# Patient Record
Sex: FEMALE | Race: ASIAN | NOT HISPANIC OR LATINO | Employment: PART TIME | URBAN - METROPOLITAN AREA
[De-identification: names, ages, dates, MRNs, and addresses within clinical notes are randomized per-mention and may not be internally consistent; named-entity substitution may affect disease eponyms.]

---

## 2024-01-26 ENCOUNTER — TRANSCRIBE ORDERS (OUTPATIENT)
Dept: OBSTETRICS AND GYNECOLOGY | Facility: CLINIC | Age: 34
End: 2024-01-26

## 2024-01-26 DIAGNOSIS — N91.1 SECONDARY AMENORRHEA: Primary | ICD-10-CM

## 2024-02-07 ENCOUNTER — HOSPITAL ENCOUNTER (OUTPATIENT)
Dept: RADIOLOGY | Facility: CLINIC | Age: 34
Discharge: HOME | End: 2024-02-07
Payer: COMMERCIAL

## 2024-02-07 ENCOUNTER — INITIAL PRENATAL (OUTPATIENT)
Dept: OBSTETRICS AND GYNECOLOGY | Facility: CLINIC | Age: 34
End: 2024-02-07
Payer: COMMERCIAL

## 2024-02-07 VITALS
WEIGHT: 178 LBS | HEIGHT: 63 IN | SYSTOLIC BLOOD PRESSURE: 120 MMHG | DIASTOLIC BLOOD PRESSURE: 84 MMHG | BODY MASS INDEX: 31.54 KG/M2

## 2024-02-07 DIAGNOSIS — Z33.1 PREGNANT STATE, INCIDENTAL (HHS-HCC): ICD-10-CM

## 2024-02-07 DIAGNOSIS — N91.1 SECONDARY AMENORRHEA: ICD-10-CM

## 2024-02-07 DIAGNOSIS — N91.1 AMENORRHEA, SECONDARY: ICD-10-CM

## 2024-02-07 LAB
ERYTHROCYTE [DISTWIDTH] IN BLOOD BY AUTOMATED COUNT: 13.2 % (ref 11.5–14.5)
HBV SURFACE AG SERPL QL IA: NONREACTIVE
HCT VFR BLD AUTO: 41.3 % (ref 36–46)
HCV AB SER QL: NONREACTIVE
HGB BLD-MCNC: 13.6 G/DL (ref 12–16)
HIV 1+2 AB+HIV1 P24 AG SERPL QL IA: NONREACTIVE
MCH RBC QN AUTO: 29.8 PG (ref 26–34)
MCHC RBC AUTO-ENTMCNC: 32.9 G/DL (ref 32–36)
MCV RBC AUTO: 91 FL (ref 80–100)
NRBC BLD-RTO: 0 /100 WBCS (ref 0–0)
PLATELET # BLD AUTO: 267 X10*3/UL (ref 150–450)
RBC # BLD AUTO: 4.56 X10*6/UL (ref 4–5.2)
RUBV IGG SERPL IA-ACNC: 0.9 IA
RUBV IGG SERPL QL IA: NORMAL
TREPONEMA PALLIDUM IGG+IGM AB [PRESENCE] IN SERUM OR PLASMA BY IMMUNOASSAY: NONREACTIVE
WBC # BLD AUTO: 9.6 X10*3/UL (ref 4.4–11.3)

## 2024-02-07 PROCEDURE — 87340 HEPATITIS B SURFACE AG IA: CPT

## 2024-02-07 PROCEDURE — 0500F INITIAL PRENATAL CARE VISIT: CPT | Performed by: OBSTETRICS & GYNECOLOGY

## 2024-02-07 PROCEDURE — 85027 COMPLETE CBC AUTOMATED: CPT

## 2024-02-07 PROCEDURE — 86317 IMMUNOASSAY INFECTIOUS AGENT: CPT

## 2024-02-07 PROCEDURE — 36415 COLL VENOUS BLD VENIPUNCTURE: CPT

## 2024-02-07 PROCEDURE — 86803 HEPATITIS C AB TEST: CPT

## 2024-02-07 PROCEDURE — 87086 URINE CULTURE/COLONY COUNT: CPT

## 2024-02-07 PROCEDURE — 87624 HPV HI-RISK TYP POOLED RSLT: CPT

## 2024-02-07 PROCEDURE — 87800 DETECT AGNT MULT DNA DIREC: CPT

## 2024-02-07 PROCEDURE — 88175 CYTOPATH C/V AUTO FLUID REDO: CPT

## 2024-02-07 PROCEDURE — 86780 TREPONEMA PALLIDUM: CPT

## 2024-02-07 PROCEDURE — 76815 OB US LIMITED FETUS(S): CPT | Performed by: OBSTETRICS & GYNECOLOGY

## 2024-02-07 PROCEDURE — 87389 HIV-1 AG W/HIV-1&-2 AB AG IA: CPT

## 2024-02-07 PROCEDURE — 86901 BLOOD TYPING SEROLOGIC RH(D): CPT

## 2024-02-07 PROCEDURE — 86850 RBC ANTIBODY SCREEN: CPT

## 2024-02-07 PROCEDURE — 86900 BLOOD TYPING SEROLOGIC ABO: CPT

## 2024-02-07 NOTE — PROGRESS NOTES
Subjective   Patient ID: Chapo Martinez is a 33 y.o. female who presents for Initial Prenatal Visit (New Ob/Lmp 11/4/23/13+ weeks, edc 8/10/24//No complaints//Chaperone declined).  HPI  As contained chief complaint  Review of Systems  Please see HPI for reported pertinent positives, which would supersede this ROS    Constitutional: No chills, no fever and no night sweats, weight gain or loss, fatigue    Genitourinary: Denies genital lesion or sores, vaginal dryness, itching or pain.  No abnormal vaginal discharge or unexplained vaginal bleeding.  No dysuria, urinary incontinence or frequency.  Denies pelvic pain, dysmenorrhea or dyspareunia    Eyes: Denies vision changes, dryness  ENT: No hearing loss, sinus pain or congestion,nosebleeds.  No hoarseness and no sore throat  Neck: No masses and no swelling  Cardiovascular: No chest pain, no intermittent leg claudication, no lower extremity edema, no palpitations and no syncope  Respiratory: No shortness of breath, cough, wheezing  Gastrointestinal: Denies nausea, vomiting, diarrhea, constipation, abdominal pain  Musculoskeletal: No new back pain joint pain, peripheral edema  Skin: No rash or skin lesion  Neurologic: No headache or dizziness.  No limb weakness, no tingling and no numbness  Psychiatric: No new anxiety or depression, no anhedonia and no substance use disorders  Hematologic/lymphatic: No swollen lymph nodes.  No reported tendency for easy bruising or bleeding new  Endocrine: No loss of hair or hirsutism    Patient reports no other systemic complaints.      Objective   Physical Exam  Vital signs reviewed    CONSTITUTIONAL- well nourished, well developed, looks like stated age.  She is sitting comfortably on the exam table in no apparent distress  SKIN- normal skin color and pigmentation no visible lesions  EYES- normal external exam  THYROID- symmetrical ,normal size and normal consistency  HEART- RRR without murmur, S3 or S4.  LUNGS- breathing  comfortably, no dyspnea  EXTREMITIES- no deformities  NEUROLOGICAL- oriented and no focal signs.  Cranial nerves II through XII intact  PSYCHIATRIC- alert, pleasant and cordial, age-appropriate, not anxious, or depressed appearing  BREASTS-normal appearance, no skin changes or nipple discharge.  Palpation of the breast and axillae: No palpable mass and no axillary lymphadenopathy  ABDOMEN- soft, non distended, bowel sound normal pitch and intensity,no palpable abnormal masses  GENITOURINARY- External genitalia: Normal.                                 - Uterus: AV/AF uterus enlarged 13 to 15 weeks gestational size fetal heart tones strong at 148 bpm, mobile and nontender                                -The adnexal areas are free of tenderness or mass                                -There are no cervical lesions; there is no cervical motion tenderness                                -Vagina without lesions, mucosa pink and well-hydrated, discharge is physiologic.                                   Inspection of Perianal Area: Normal    Assessment/Plan   Diagnoses and all orders for this visit:  Amenorrhea, secondary  Pregnant state, incidental  -Will check pelvic ultrasound for viability and dating.  -Discussed prenatal care plan  -Prenatal lab obtained.  -Return to office 3 weeks           Quoc Sawyer DO 02/07/24 11:17 AM

## 2024-02-08 LAB
ABO GROUP (TYPE) IN BLOOD: NORMAL
ANTIBODY SCREEN: NORMAL
BACTERIA UR CULT: NO GROWTH
C TRACH RRNA SPEC QL NAA+PROBE: NEGATIVE
N GONORRHOEA DNA SPEC QL PROBE+SIG AMP: NEGATIVE
RH FACTOR (ANTIGEN D): NORMAL

## 2024-02-22 LAB
CYTOLOGY CMNT CVX/VAG CYTO-IMP: NORMAL
HPV HR 12 DNA GENITAL QL NAA+PROBE: POSITIVE
HPV HR GENOTYPES PNL CVX NAA+PROBE: POSITIVE
HPV16 DNA SPEC QL NAA+PROBE: NEGATIVE
HPV18 DNA SPEC QL NAA+PROBE: NEGATIVE
LAB AP HPV GENOTYPE QUESTION: YES
LAB AP HPV HR: NORMAL
LABORATORY COMMENT REPORT: NORMAL
LMP START DATE: NORMAL
MENSTRUAL HX REPORTED: NORMAL
PATH REPORT.TOTAL CANCER: NORMAL

## 2024-02-28 ENCOUNTER — ROUTINE PRENATAL (OUTPATIENT)
Dept: OBSTETRICS AND GYNECOLOGY | Facility: CLINIC | Age: 34
End: 2024-02-28
Payer: COMMERCIAL

## 2024-02-28 VITALS — WEIGHT: 179 LBS | DIASTOLIC BLOOD PRESSURE: 86 MMHG | SYSTOLIC BLOOD PRESSURE: 126 MMHG | BODY MASS INDEX: 31.71 KG/M2

## 2024-02-28 DIAGNOSIS — Z36.3 SCREENING, ANTENATAL, FOR MALFORMATION BY ULTRASOUND (HHS-HCC): ICD-10-CM

## 2024-02-28 DIAGNOSIS — Z3A.16 16 WEEKS GESTATION OF PREGNANCY (HHS-HCC): ICD-10-CM

## 2024-02-28 DIAGNOSIS — Z34.02 ENCOUNTER FOR SUPERVISION OF NORMAL FIRST PREGNANCY IN SECOND TRIMESTER (HHS-HCC): ICD-10-CM

## 2024-02-28 PROCEDURE — 0501F PRENATAL FLOW SHEET: CPT | Performed by: OBSTETRICS & GYNECOLOGY

## 2024-02-28 NOTE — PROGRESS NOTES
"Subjective   Patient ID [id number]   Chapo Martinez is a 33 y.o.  at 16w4d with a working estimated date of delivery of 8/10/2024, by Last Menstrual Period who presents for a routine prenatal visit. She denies vaginal bleeding, leakage of fluid, decreased fetal movements, or contractions.      Objective   Physical Exam  Weight: 81.2 kg (179 lb)  Expected Total Weight Gain: 5 kg (11 lb)-9 kg (19 lb)   Pregravid BMI: 31.01  BP: 126/86         Prenatal Labs  Urine dip:  No results found for: \"KETONESU\", \"GLUCOSEUR\", \"LEUKOCYTESUR\"    Lab Results   Component Value Date    HGB 13.6 2024    HCT 41.3 2024    ABO A 2024    HEPBSAG Nonreactive 2024     No results found for: \"PAPPA\", \"AFP\", \"HCG\", \"ESTRIOL\", \"INHBA\"  No results found for: \"GLUF\", \"GLUT1\", \"MUJUUWF1DT\", \"QPVPBUQ4DI\"    Imaging  The most recent ultrasound was performed on The most recent ultrasound study is not finalized with a study GA of The most recent ultrasound study is not finalized and EFW of The most recent ultrasound study is not finalized.  The most recent ultrasound study is not finalized  The most recent ultrasound study is not finalized    Assessment/Plan   Diagnoses and all orders for this visit:  Screening, , for malformation by ultrasound  -     US OB 14+ weeks anatomy scan; Future  Encounter for supervision of normal first pregnancy in second trimester  16 weeks gestation of pregnancy      Continue prenatal vitamin.    "

## 2024-03-27 ENCOUNTER — APPOINTMENT (OUTPATIENT)
Dept: RADIOLOGY | Facility: CLINIC | Age: 34
End: 2024-03-27
Payer: COMMERCIAL

## 2024-03-27 ENCOUNTER — APPOINTMENT (OUTPATIENT)
Dept: OBSTETRICS AND GYNECOLOGY | Facility: CLINIC | Age: 34
End: 2024-03-27
Payer: COMMERCIAL

## 2024-04-03 ENCOUNTER — ROUTINE PRENATAL (OUTPATIENT)
Dept: OBSTETRICS AND GYNECOLOGY | Facility: CLINIC | Age: 34
End: 2024-04-03
Payer: COMMERCIAL

## 2024-04-03 ENCOUNTER — HOSPITAL ENCOUNTER (OUTPATIENT)
Dept: RADIOLOGY | Facility: CLINIC | Age: 34
Discharge: HOME | End: 2024-04-03
Payer: COMMERCIAL

## 2024-04-03 VITALS — WEIGHT: 180 LBS | SYSTOLIC BLOOD PRESSURE: 118 MMHG | BODY MASS INDEX: 31.89 KG/M2 | DIASTOLIC BLOOD PRESSURE: 82 MMHG

## 2024-04-03 DIAGNOSIS — Z3A.21 21 WEEKS GESTATION OF PREGNANCY (HHS-HCC): ICD-10-CM

## 2024-04-03 DIAGNOSIS — Z36.3 SCREENING, ANTENATAL, FOR MALFORMATION BY ULTRASOUND (HHS-HCC): ICD-10-CM

## 2024-04-03 DIAGNOSIS — Z34.02 ENCOUNTER FOR SUPERVISION OF NORMAL FIRST PREGNANCY IN SECOND TRIMESTER (HHS-HCC): ICD-10-CM

## 2024-04-03 PROCEDURE — 76811 OB US DETAILED SNGL FETUS: CPT | Performed by: OBSTETRICS & GYNECOLOGY

## 2024-04-03 PROCEDURE — 0501F PRENATAL FLOW SHEET: CPT | Performed by: OBSTETRICS & GYNECOLOGY

## 2024-04-03 NOTE — PROGRESS NOTES
"Subjective   Patient ID [id number]   Chapo Martinez is a 33 y.o.  at 21w4d with a working estimated date of delivery of 8/10/2024, by Last Menstrual Period who presents for a routine prenatal visit. She denies vaginal bleeding, leakage of fluid, decreased fetal movements, or contractions.        Objective   Physical Exam  Weight: 81.6 kg (180 lb)  Expected Total Weight Gain: 5 kg (11 lb)-9 kg (19 lb)   Pregravid BMI: 31.01  BP: 118/82         Prenatal Labs  Urine dip:  No results found for: \"KETONESU\", \"GLUCOSEUR\", \"LEUKOCYTESUR\"    Lab Results   Component Value Date    HGB 13.6 2024    HCT 41.3 2024    ABO A 2024    HEPBSAG Nonreactive 2024     No results found for: \"PAPPA\", \"AFP\", \"HCG\", \"ESTRIOL\", \"INHBA\"  No results found for: \"GLUF\", \"GLUT1\", \"WNZQOTB5JZ\", \"ZLXNCSH4IH\"    Imaging  The most recent ultrasound was performed on US 24 13w0d EDC 24  NT 1.3mms- LAY  US 4-3-24 20w0d EDC 24 female - LAY  Assessment/Plan   Diagnoses and all orders for this visit:  Encounter for supervision of normal first pregnancy in second trimester  21 weeks gestation of pregnancy      Continue prenatal vitamin.    "

## 2024-04-24 ENCOUNTER — ROUTINE PRENATAL (OUTPATIENT)
Dept: OBSTETRICS AND GYNECOLOGY | Facility: CLINIC | Age: 34
End: 2024-04-24
Payer: COMMERCIAL

## 2024-04-24 VITALS — SYSTOLIC BLOOD PRESSURE: 124 MMHG | BODY MASS INDEX: 32.59 KG/M2 | DIASTOLIC BLOOD PRESSURE: 84 MMHG | WEIGHT: 184 LBS

## 2024-04-24 DIAGNOSIS — Z34.02 ENCOUNTER FOR SUPERVISION OF NORMAL FIRST PREGNANCY IN SECOND TRIMESTER (HHS-HCC): ICD-10-CM

## 2024-04-24 DIAGNOSIS — Z3A.24 24 WEEKS GESTATION OF PREGNANCY (HHS-HCC): ICD-10-CM

## 2024-04-24 PROCEDURE — 0501F PRENATAL FLOW SHEET: CPT | Performed by: OBSTETRICS & GYNECOLOGY

## 2024-04-24 NOTE — PROGRESS NOTES
"Subjective   Patient ID [id number]   Chapo Martinez is a 33 y.o.  at 24w4d with a working estimated date of delivery of 8/10/2024, by Last Menstrual Period who presents for a routine prenatal visit. She denies vaginal bleeding, leakage of fluid, decreased fetal movements, or contractions.      Objective   Physical Exam  Weight: 83.5 kg (184 lb)  Expected Total Weight Gain: 5 kg (11 lb)-9 kg (19 lb)   Pregravid BMI: 31.01  BP: 124/84         Prenatal Labs  Urine dip:  No results found for: \"KETONESU\", \"GLUCOSEUR\", \"LEUKOCYTESUR\"    Lab Results   Component Value Date    HGB 13.6 2024    HCT 41.3 2024    ABO A 2024    HEPBSAG Nonreactive 2024     No results found for: \"PAPPA\", \"AFP\", \"HCG\", \"ESTRIOL\", \"INHBA\"  No results found for: \"GLUF\", \"GLUT1\", \"WYQFRFE7SC\", \"AVAJSVK1SR\"    Imaging  The most recent ultrasound was performed on  US 24 13w0d [facility] 24  NT 1.3mms- LAY  US 4-3-24 20w0d EDC 24 female - LAY    Assessment/Plan   Diagnoses and all orders for this visit:  Encounter for supervision of normal first pregnancy in second trimester ([facility])  24 weeks gestation of pregnancy ([facility])      Continue prenatal vitamin.    "

## 2024-05-22 ENCOUNTER — ROUTINE PRENATAL (OUTPATIENT)
Dept: OBSTETRICS AND GYNECOLOGY | Facility: CLINIC | Age: 34
End: 2024-05-22
Payer: COMMERCIAL

## 2024-05-22 VITALS — DIASTOLIC BLOOD PRESSURE: 86 MMHG | SYSTOLIC BLOOD PRESSURE: 128 MMHG | BODY MASS INDEX: 32.77 KG/M2 | WEIGHT: 185 LBS

## 2024-05-22 DIAGNOSIS — Z3A.28 28 WEEKS GESTATION OF PREGNANCY (HHS-HCC): ICD-10-CM

## 2024-05-22 DIAGNOSIS — Z34.03 ENCOUNTER FOR SUPERVISION OF NORMAL FIRST PREGNANCY IN THIRD TRIMESTER (HHS-HCC): ICD-10-CM

## 2024-05-22 LAB
ERYTHROCYTE [DISTWIDTH] IN BLOOD BY AUTOMATED COUNT: 13.6 % (ref 11.5–14.5)
GLUCOSE 1H P 50 G GLC PO SERPL-MCNC: 126 MG/DL
HCT VFR BLD AUTO: 44 % (ref 36–46)
HGB BLD-MCNC: 14.3 G/DL (ref 12–16)
MCH RBC QN AUTO: 30.7 PG (ref 26–34)
MCHC RBC AUTO-ENTMCNC: 32.5 G/DL (ref 32–36)
MCV RBC AUTO: 94 FL (ref 80–100)
NRBC BLD-RTO: 0 /100 WBCS (ref 0–0)
PLATELET # BLD AUTO: 283 X10*3/UL (ref 150–450)
RBC # BLD AUTO: 4.66 X10*6/UL (ref 4–5.2)
WBC # BLD AUTO: 9.2 X10*3/UL (ref 4.4–11.3)

## 2024-05-22 PROCEDURE — 0501F PRENATAL FLOW SHEET: CPT | Performed by: OBSTETRICS & GYNECOLOGY

## 2024-05-22 PROCEDURE — 86780 TREPONEMA PALLIDUM: CPT

## 2024-05-22 PROCEDURE — 36415 COLL VENOUS BLD VENIPUNCTURE: CPT

## 2024-05-22 PROCEDURE — 85027 COMPLETE CBC AUTOMATED: CPT

## 2024-05-22 PROCEDURE — 82947 ASSAY GLUCOSE BLOOD QUANT: CPT

## 2024-05-22 NOTE — PROGRESS NOTES
"Subjective   Patient ID [id number]   Chapo Martinez is a 33 y.o.  at 28w4d with a working estimated date of delivery of 8/10/2024, by Last Menstrual Period who presents for a routine prenatal visit. She denies vaginal bleeding, leakage of fluid, decreased fetal movements, or contractions.        Objective   Physical Exam  Weight: 83.9 kg (185 lb)  Expected Total Weight Gain: 5 kg (11 lb)-9 kg (19 lb)   Pregravid BMI: 31.01  BP: 128/86         Prenatal Labs  Urine dip:  No results found for: \"KETONESU\", \"GLUCOSEUR\", \"LEUKOCYTESUR\"    Lab Results   Component Value Date    HGB 13.6 2024    HCT 41.3 2024    ABO A 2024    HEPBSAG Nonreactive 2024     No results found for: \"PAPPA\", \"AFP\", \"HCG\", \"ESTRIOL\", \"INHBA\"  No results found for: \"GLUF\", \"GLUT1\", \"GNMIHPA6NG\", \"EZTLYDQ2KJ\"    Imaging  The most recent ultrasound was performed on   US 4-3-24 20w0d EDC 24 female           Assessment/Plan   Diagnoses and all orders for this visit:  Encounter for supervision of normal first pregnancy in third trimester ([facility])  28 weeks gestation of pregnancy ([facility])      Continue prenatal vitamin.    "

## 2024-05-23 LAB — TREPONEMA PALLIDUM IGG+IGM AB [PRESENCE] IN SERUM OR PLASMA BY IMMUNOASSAY: NONREACTIVE

## 2024-06-19 ENCOUNTER — APPOINTMENT (OUTPATIENT)
Dept: OBSTETRICS AND GYNECOLOGY | Facility: CLINIC | Age: 34
End: 2024-06-19
Payer: COMMERCIAL

## 2024-06-19 VITALS — BODY MASS INDEX: 33.3 KG/M2 | WEIGHT: 188 LBS | SYSTOLIC BLOOD PRESSURE: 124 MMHG | DIASTOLIC BLOOD PRESSURE: 82 MMHG

## 2024-06-19 DIAGNOSIS — Z3A.32 32 WEEKS GESTATION OF PREGNANCY (HHS-HCC): ICD-10-CM

## 2024-06-19 DIAGNOSIS — Z34.03 ENCOUNTER FOR SUPERVISION OF NORMAL FIRST PREGNANCY IN THIRD TRIMESTER (HHS-HCC): ICD-10-CM

## 2024-06-19 PROCEDURE — 0501F PRENATAL FLOW SHEET: CPT | Performed by: OBSTETRICS & GYNECOLOGY

## 2024-06-19 NOTE — PROGRESS NOTES
"Subjective   Patient ID [id number]   Chapo Martinez is a 33 y.o.  at 32w4d with a working estimated date of delivery of 8/10/2024, by Last Menstrual Period who presents for a routine prenatal visit. She denies vaginal bleeding, leakage of fluid, decreased fetal movements, or contractions.      Objective   Physical Exam:   Weight: 85.3 kg (188 lb)  Expected Total Weight Gain: 5 kg (11 lb)-9 kg (19 lb)   Pregravid BMI: 31.01  BP: 124/82                  Prenatal Labs  Urine Dip:  No results found for: \"KETONESU\", \"GLUCOSEUR\", \"LEUKOCYTESUR\"  Lab Results   Component Value Date    HGB 14.3 2024    HCT 44.0 2024    ABO A 2024    HEPBSAG Nonreactive 2024     No results found for: \"PAPPA\", \"AFP\", \"HCG\", \"ESTRIOL\", \"INHBA\"  No results found for: \"GLUF\", \"GLUT1\", \"URZRQOX6ZG\", \"AGJYCJY6LP\"    ImagingUS 4-3-24 20w0d EDC 24 female - LAY      Assessment/Plan   Diagnoses and all orders for this visit:  Encounter for supervision of normal first pregnancy in third trimester ([facility])  32 weeks gestation of pregnancy ([facility])    Continue prenatal vitamin.    "

## 2024-07-03 ENCOUNTER — APPOINTMENT (OUTPATIENT)
Dept: OBSTETRICS AND GYNECOLOGY | Facility: CLINIC | Age: 34
End: 2024-07-03
Payer: COMMERCIAL

## 2024-07-03 VITALS — WEIGHT: 188 LBS | DIASTOLIC BLOOD PRESSURE: 82 MMHG | BODY MASS INDEX: 33.3 KG/M2 | SYSTOLIC BLOOD PRESSURE: 112 MMHG

## 2024-07-03 DIAGNOSIS — Z3A.34 34 WEEKS GESTATION OF PREGNANCY (HHS-HCC): ICD-10-CM

## 2024-07-03 DIAGNOSIS — Z34.03 ENCOUNTER FOR SUPERVISION OF NORMAL FIRST PREGNANCY IN THIRD TRIMESTER (HHS-HCC): ICD-10-CM

## 2024-07-03 DIAGNOSIS — O36.8130 DECREASED FETAL MOVEMENTS IN THIRD TRIMESTER, SINGLE OR UNSPECIFIED FETUS (HHS-HCC): ICD-10-CM

## 2024-07-03 PROCEDURE — 0501F PRENATAL FLOW SHEET: CPT | Performed by: OBSTETRICS & GYNECOLOGY

## 2024-07-03 NOTE — PROGRESS NOTES
"Subjective   Patient ID [id number]   Chapo Martinez is a 33 y.o.  at 34w4d with a working estimated date of delivery of 8/10/2024, by Last Menstrual Period who presents for a routine prenatal visit. She denies vaginal bleeding, leakage of fluid, decreased fetal movements, or contractions.      Objective   Physical Exam:   Weight: 85.3 kg (188 lb)  Expected Total Weight Gain: 5 kg (11 lb)-9 kg (19 lb)   Pregravid BMI: 31.01  BP: 112/82                  Prenatal Labs  Urine Dip:  No results found for: \"KETONESU\", \"GLUCOSEUR\", \"LEUKOCYTESUR\"  Lab Results   Component Value Date    HGB 14.3 2024    HCT 44.0 2024    ABO A 2024    HEPBSAG Nonreactive 2024     No results found for: \"PAPPA\", \"AFP\", \"HCG\", \"ESTRIOL\", \"INHBA\"  No results found for: \"GLUF\", \"GLUT1\", \"HPYKDGA0EG\", \"RAVOFNM8EJ\"    Imaging      Assessment/Plan   Diagnoses and all orders for this visit:  Encounter for supervision of normal first pregnancy in third trimester ([facility])  34 weeks gestation of pregnancy ([facility])  Decreased fetal movements in third trimester, single or unspecified fetus ([facility])  -     US OB 14+ weeks anatomy scan; Future    Continue prenatal vitamin.    "

## 2024-07-13 ENCOUNTER — HOSPITAL ENCOUNTER (INPATIENT)
Facility: HOSPITAL | Age: 34
End: 2024-07-13
Attending: STUDENT IN AN ORGANIZED HEALTH CARE EDUCATION/TRAINING PROGRAM | Admitting: STUDENT IN AN ORGANIZED HEALTH CARE EDUCATION/TRAINING PROGRAM
Payer: COMMERCIAL

## 2024-07-13 ENCOUNTER — APPOINTMENT (OUTPATIENT)
Dept: CARDIOLOGY | Facility: HOSPITAL | Age: 34
End: 2024-07-13
Payer: COMMERCIAL

## 2024-07-13 ENCOUNTER — ANESTHESIA EVENT (OUTPATIENT)
Dept: OBSTETRICS AND GYNECOLOGY | Facility: HOSPITAL | Age: 34
End: 2024-07-13
Payer: COMMERCIAL

## 2024-07-13 ENCOUNTER — ANESTHESIA (OUTPATIENT)
Dept: OBSTETRICS AND GYNECOLOGY | Facility: HOSPITAL | Age: 34
End: 2024-07-13
Payer: COMMERCIAL

## 2024-07-13 DIAGNOSIS — R07.9 CHEST PAIN, UNSPECIFIED: ICD-10-CM

## 2024-07-13 DIAGNOSIS — I21.4 NSTEMI (NON-ST ELEVATED MYOCARDIAL INFARCTION) (MULTI): ICD-10-CM

## 2024-07-13 DIAGNOSIS — O14.13 SEVERE PREECLAMPSIA, THIRD TRIMESTER (HHS-HCC): ICD-10-CM

## 2024-07-13 DIAGNOSIS — O14.93 PRE-ECLAMPSIA IN THIRD TRIMESTER (HHS-HCC): Primary | ICD-10-CM

## 2024-07-13 PROBLEM — O14.90 PREECLAMPSIA (HHS-HCC): Status: ACTIVE | Noted: 2024-07-13

## 2024-07-13 LAB
ABO GROUP (TYPE) IN BLOOD: NORMAL
ABO GROUP (TYPE) IN BLOOD: NORMAL
ALBUMIN SERPL BCP-MCNC: 3.2 G/DL (ref 3.4–5)
ALBUMIN SERPL BCP-MCNC: 3.7 G/DL (ref 3.4–5)
ALP SERPL-CCNC: 143 U/L (ref 33–110)
ALP SERPL-CCNC: 165 U/L (ref 33–110)
ALT SERPL W P-5'-P-CCNC: 1120 U/L (ref 7–45)
ALT SERPL W P-5'-P-CCNC: 278 U/L (ref 7–45)
ANION GAP SERPL CALC-SCNC: 16 MMOL/L (ref 10–20)
ANION GAP SERPL CALC-SCNC: 16 MMOL/L (ref 10–20)
ANTIBODY SCREEN: NORMAL
ANTIBODY SCREEN: NORMAL
APTT PPP: 28 SECONDS (ref 27–38)
APTT PPP: 30 SECONDS (ref 27–38)
AST SERPL W P-5'-P-CCNC: 1174 U/L (ref 9–39)
AST SERPL W P-5'-P-CCNC: 289 U/L (ref 9–39)
BASE EXCESS BLDCOA CALC-SCNC: -2.1 MMOL/L (ref -10.8–-0.5)
BASE EXCESS BLDCOV CALC-SCNC: -3.7 MMOL/L (ref -8.1–-0.5)
BILIRUB SERPL-MCNC: 0.3 MG/DL (ref 0–1.2)
BILIRUB SERPL-MCNC: 1.6 MG/DL (ref 0–1.2)
BNP SERPL-MCNC: 21 PG/ML (ref 0–99)
BNP SERPL-MCNC: 52 PG/ML (ref 0–99)
BODY TEMPERATURE: 37 DEGREES CELSIUS
BODY TEMPERATURE: 37 DEGREES CELSIUS
BUN SERPL-MCNC: 15 MG/DL (ref 6–23)
BUN SERPL-MCNC: 16 MG/DL (ref 6–23)
CALCIUM SERPL-MCNC: 10 MG/DL (ref 8.6–10.3)
CALCIUM SERPL-MCNC: 8.9 MG/DL (ref 8.6–10.6)
CARDIAC TROPONIN I PNL SERPL HS: 21 NG/L (ref 0–13)
CARDIAC TROPONIN I PNL SERPL HS: 266 NG/L (ref 0–13)
CARDIAC TROPONIN I PNL SERPL HS: 297 NG/L (ref 0–34)
CHLORIDE SERPL-SCNC: 101 MMOL/L (ref 98–107)
CHLORIDE SERPL-SCNC: 101 MMOL/L (ref 98–107)
CO2 SERPL-SCNC: 20 MMOL/L (ref 21–32)
CO2 SERPL-SCNC: 23 MMOL/L (ref 21–32)
CREAT SERPL-MCNC: 0.74 MG/DL (ref 0.5–1.05)
CREAT SERPL-MCNC: 0.82 MG/DL (ref 0.5–1.05)
CREAT UR-MCNC: 70.1 MG/DL (ref 20–320)
EGFRCR SERPLBLD CKD-EPI 2021: >90 ML/MIN/1.73M*2
EGFRCR SERPLBLD CKD-EPI 2021: >90 ML/MIN/1.73M*2
ERYTHROCYTE [DISTWIDTH] IN BLOOD BY AUTOMATED COUNT: 12.8 % (ref 11.5–14.5)
ERYTHROCYTE [DISTWIDTH] IN BLOOD BY AUTOMATED COUNT: 12.9 % (ref 11.5–14.5)
FIBRINOGEN PPP-MCNC: 377 MG/DL (ref 200–400)
FIBRINOGEN PPP-MCNC: 625 MG/DL (ref 200–400)
GLUCOSE SERPL-MCNC: 118 MG/DL (ref 74–99)
GLUCOSE SERPL-MCNC: 91 MG/DL (ref 74–99)
HCO3 BLDCOA-SCNC: 26 MMOL/L (ref 15–29)
HCO3 BLDCOV-SCNC: 23.5 MMOL/L (ref 16–26)
HCT VFR BLD AUTO: 42.1 % (ref 36–46)
HCT VFR BLD AUTO: 50.4 % (ref 36–46)
HGB BLD-MCNC: 14.4 G/DL (ref 12–16)
HGB BLD-MCNC: 16.9 G/DL (ref 12–16)
HOLD SPECIMEN: NORMAL
INHALED O2 CONCENTRATION: 21 %
INHALED O2 CONCENTRATION: 21 %
INR PPP: 0.8 (ref 0.9–1.1)
INR PPP: 0.8 (ref 0.9–1.1)
LDH SERPL L TO P-CCNC: 1688 U/L (ref 84–246)
LDH SERPL L TO P-CCNC: 580 U/L (ref 84–246)
MCH RBC QN AUTO: 30.1 PG (ref 26–34)
MCH RBC QN AUTO: 30.6 PG (ref 26–34)
MCHC RBC AUTO-ENTMCNC: 33.5 G/DL (ref 32–36)
MCHC RBC AUTO-ENTMCNC: 34.2 G/DL (ref 32–36)
MCV RBC AUTO: 88 FL (ref 80–100)
MCV RBC AUTO: 91 FL (ref 80–100)
NRBC BLD-RTO: 0 /100 WBCS (ref 0–0)
NRBC BLD-RTO: 0 /100 WBCS (ref 0–0)
OXYHGB MFR BLDCOA: 21.9 % (ref 94–98)
OXYHGB MFR BLDCOV: 35.6 % (ref 94–98)
PCO2 BLDCOA: 58 MM HG (ref 31–75)
PCO2 BLDCOV: 50 MM HG (ref 22–53)
PH BLDCOA: 7.26 PH (ref 7.08–7.39)
PH BLDCOV: 7.28 PH (ref 7.19–7.47)
PLATELET # BLD AUTO: 207 X10*3/UL (ref 150–450)
PLATELET # BLD AUTO: ABNORMAL 10*3/UL
PO2 BLDCOA: 14 MM HG (ref 5–31)
PO2 BLDCOV: 17 MM HG (ref 13–37)
POTASSIUM SERPL-SCNC: 3.9 MMOL/L (ref 3.5–5.3)
POTASSIUM SERPL-SCNC: 4.1 MMOL/L (ref 3.5–5.3)
PROT SERPL-MCNC: 6.1 G/DL (ref 6.4–8.2)
PROT SERPL-MCNC: 7.9 G/DL (ref 6.4–8.2)
PROT UR-ACNC: >1000 MG/DL (ref 5–24)
PROT/CREAT UR: ABNORMAL MG/G{CREAT}
PROTHROMBIN TIME: 9.4 SECONDS (ref 9.8–12.8)
PROTHROMBIN TIME: 9.4 SECONDS (ref 9.8–12.8)
RBC # BLD AUTO: 4.79 X10*6/UL (ref 4–5.2)
RBC # BLD AUTO: 5.53 X10*6/UL (ref 4–5.2)
RH FACTOR (ANTIGEN D): NORMAL
RH FACTOR (ANTIGEN D): NORMAL
SAO2 % BLDCOA: 22 % (ref 3–69)
SAO2 % BLDCOV: 36 % (ref 16–84)
SODIUM SERPL-SCNC: 133 MMOL/L (ref 136–145)
SODIUM SERPL-SCNC: 136 MMOL/L (ref 136–145)
URATE SERPL-MCNC: 6.4 MG/DL (ref 2.3–6.7)
WBC # BLD AUTO: 10.2 X10*3/UL (ref 4.4–11.3)
WBC # BLD AUTO: 17.2 X10*3/UL (ref 4.4–11.3)

## 2024-07-13 PROCEDURE — 2500000004 HC RX 250 GENERAL PHARMACY W/ HCPCS (ALT 636 FOR OP/ED): Performed by: STUDENT IN AN ORGANIZED HEALTH CARE EDUCATION/TRAINING PROGRAM

## 2024-07-13 PROCEDURE — 37799 UNLISTED PX VASCULAR SURGERY: CPT | Performed by: STUDENT IN AN ORGANIZED HEALTH CARE EDUCATION/TRAINING PROGRAM

## 2024-07-13 PROCEDURE — 85027 COMPLETE CBC AUTOMATED: CPT | Performed by: STUDENT IN AN ORGANIZED HEALTH CARE EDUCATION/TRAINING PROGRAM

## 2024-07-13 PROCEDURE — 84550 ASSAY OF BLOOD/URIC ACID: CPT | Performed by: STUDENT IN AN ORGANIZED HEALTH CARE EDUCATION/TRAINING PROGRAM

## 2024-07-13 PROCEDURE — 59050 FETAL MONITOR W/REPORT: CPT

## 2024-07-13 PROCEDURE — 36415 COLL VENOUS BLD VENIPUNCTURE: CPT | Performed by: STUDENT IN AN ORGANIZED HEALTH CARE EDUCATION/TRAINING PROGRAM

## 2024-07-13 PROCEDURE — 59514 CESAREAN DELIVERY ONLY: CPT | Performed by: STUDENT IN AN ORGANIZED HEALTH CARE EDUCATION/TRAINING PROGRAM

## 2024-07-13 PROCEDURE — 83615 LACTATE (LD) (LDH) ENZYME: CPT | Performed by: STUDENT IN AN ORGANIZED HEALTH CARE EDUCATION/TRAINING PROGRAM

## 2024-07-13 PROCEDURE — 1220000001 HC OB SEMI-PRIVATE ROOM DAILY

## 2024-07-13 PROCEDURE — 59025 FETAL NON-STRESS TEST: CPT | Performed by: STUDENT IN AN ORGANIZED HEALTH CARE EDUCATION/TRAINING PROGRAM

## 2024-07-13 PROCEDURE — 2500000002 HC RX 250 W HCPCS SELF ADMINISTERED DRUGS (ALT 637 FOR MEDICARE OP, ALT 636 FOR OP/ED)

## 2024-07-13 PROCEDURE — 86850 RBC ANTIBODY SCREEN: CPT | Performed by: STUDENT IN AN ORGANIZED HEALTH CARE EDUCATION/TRAINING PROGRAM

## 2024-07-13 PROCEDURE — 85384 FIBRINOGEN ACTIVITY: CPT | Performed by: STUDENT IN AN ORGANIZED HEALTH CARE EDUCATION/TRAINING PROGRAM

## 2024-07-13 PROCEDURE — 82805 BLOOD GASES W/O2 SATURATION: CPT | Performed by: STUDENT IN AN ORGANIZED HEALTH CARE EDUCATION/TRAINING PROGRAM

## 2024-07-13 PROCEDURE — 2500000002 HC RX 250 W HCPCS SELF ADMINISTERED DRUGS (ALT 637 FOR MEDICARE OP, ALT 636 FOR OP/ED): Performed by: STUDENT IN AN ORGANIZED HEALTH CARE EDUCATION/TRAINING PROGRAM

## 2024-07-13 PROCEDURE — 2500000001 HC RX 250 WO HCPCS SELF ADMINISTERED DRUGS (ALT 637 FOR MEDICARE OP): Performed by: STUDENT IN AN ORGANIZED HEALTH CARE EDUCATION/TRAINING PROGRAM

## 2024-07-13 PROCEDURE — 93005 ELECTROCARDIOGRAM TRACING: CPT

## 2024-07-13 PROCEDURE — 85730 THROMBOPLASTIN TIME PARTIAL: CPT | Performed by: STUDENT IN AN ORGANIZED HEALTH CARE EDUCATION/TRAINING PROGRAM

## 2024-07-13 PROCEDURE — 2720000007 HC OR 272 NO HCPCS: Performed by: STUDENT IN AN ORGANIZED HEALTH CARE EDUCATION/TRAINING PROGRAM

## 2024-07-13 PROCEDURE — 86780 TREPONEMA PALLIDUM: CPT | Mod: STJLAB | Performed by: STUDENT IN AN ORGANIZED HEALTH CARE EDUCATION/TRAINING PROGRAM

## 2024-07-13 PROCEDURE — 83880 ASSAY OF NATRIURETIC PEPTIDE: CPT | Performed by: STUDENT IN AN ORGANIZED HEALTH CARE EDUCATION/TRAINING PROGRAM

## 2024-07-13 PROCEDURE — 2500000005 HC RX 250 GENERAL PHARMACY W/O HCPCS: Performed by: STUDENT IN AN ORGANIZED HEALTH CARE EDUCATION/TRAINING PROGRAM

## 2024-07-13 PROCEDURE — 7100000016 HC LABOR RECOVERY PER HOUR: Performed by: STUDENT IN AN ORGANIZED HEALTH CARE EDUCATION/TRAINING PROGRAM

## 2024-07-13 PROCEDURE — 84156 ASSAY OF PROTEIN URINE: CPT | Performed by: STUDENT IN AN ORGANIZED HEALTH CARE EDUCATION/TRAINING PROGRAM

## 2024-07-13 PROCEDURE — 84132 ASSAY OF SERUM POTASSIUM: CPT | Performed by: STUDENT IN AN ORGANIZED HEALTH CARE EDUCATION/TRAINING PROGRAM

## 2024-07-13 PROCEDURE — 99199 UNLISTED SPECIAL SVC PX/RPRT: CPT

## 2024-07-13 PROCEDURE — 3700000014 HC AN EPIDURAL BLOCK CHARGE: Performed by: STUDENT IN AN ORGANIZED HEALTH CARE EDUCATION/TRAINING PROGRAM

## 2024-07-13 PROCEDURE — 36620 INSERTION CATHETER ARTERY: CPT | Mod: GC | Performed by: STUDENT IN AN ORGANIZED HEALTH CARE EDUCATION/TRAINING PROGRAM

## 2024-07-13 PROCEDURE — 84484 ASSAY OF TROPONIN QUANT: CPT | Performed by: STUDENT IN AN ORGANIZED HEALTH CARE EDUCATION/TRAINING PROGRAM

## 2024-07-13 PROCEDURE — 82040 ASSAY OF SERUM ALBUMIN: CPT | Performed by: STUDENT IN AN ORGANIZED HEALTH CARE EDUCATION/TRAINING PROGRAM

## 2024-07-13 PROCEDURE — 99291 CRITICAL CARE FIRST HOUR: CPT

## 2024-07-13 PROCEDURE — 84075 ASSAY ALKALINE PHOSPHATASE: CPT | Performed by: STUDENT IN AN ORGANIZED HEALTH CARE EDUCATION/TRAINING PROGRAM

## 2024-07-13 PROCEDURE — 82810 BLOOD GASES O2 SAT ONLY: CPT | Performed by: STUDENT IN AN ORGANIZED HEALTH CARE EDUCATION/TRAINING PROGRAM

## 2024-07-13 PROCEDURE — 87081 CULTURE SCREEN ONLY: CPT | Mod: STJLAB | Performed by: STUDENT IN AN ORGANIZED HEALTH CARE EDUCATION/TRAINING PROGRAM

## 2024-07-13 PROCEDURE — 7210000002 HC LABOR PER HOUR

## 2024-07-13 PROCEDURE — 85610 PROTHROMBIN TIME: CPT | Performed by: STUDENT IN AN ORGANIZED HEALTH CARE EDUCATION/TRAINING PROGRAM

## 2024-07-13 PROCEDURE — 86923 COMPATIBILITY TEST ELECTRIC: CPT

## 2024-07-13 RX ORDER — ETOMIDATE 2 MG/ML
INJECTION INTRAVENOUS AS NEEDED
Status: DISCONTINUED | OUTPATIENT
Start: 2024-07-13 | End: 2024-07-14

## 2024-07-13 RX ORDER — HYDRALAZINE HYDROCHLORIDE 20 MG/ML
5 INJECTION INTRAMUSCULAR; INTRAVENOUS ONCE AS NEEDED
Status: COMPLETED | OUTPATIENT
Start: 2024-07-13 | End: 2024-07-13

## 2024-07-13 RX ORDER — HYDROMORPHONE HYDROCHLORIDE 1 MG/ML
INJECTION, SOLUTION INTRAMUSCULAR; INTRAVENOUS; SUBCUTANEOUS AS NEEDED
Status: DISCONTINUED | OUTPATIENT
Start: 2024-07-13 | End: 2024-07-14

## 2024-07-13 RX ORDER — ROCURONIUM BROMIDE 10 MG/ML
INJECTION, SOLUTION INTRAVENOUS AS NEEDED
Status: DISCONTINUED | OUTPATIENT
Start: 2024-07-13 | End: 2024-07-14

## 2024-07-13 RX ORDER — NIFEDIPINE 10 MG/1
10 CAPSULE ORAL ONCE AS NEEDED
Status: DISCONTINUED | OUTPATIENT
Start: 2024-07-13 | End: 2024-07-14 | Stop reason: HOSPADM

## 2024-07-13 RX ORDER — TERBUTALINE SULFATE 1 MG/ML
0.25 INJECTION SUBCUTANEOUS ONCE AS NEEDED
Status: DISCONTINUED | OUTPATIENT
Start: 2024-07-13 | End: 2024-07-14 | Stop reason: HOSPADM

## 2024-07-13 RX ORDER — NIFEDIPINE 30 MG/1
30 TABLET, FILM COATED, EXTENDED RELEASE ORAL DAILY
Status: DISCONTINUED | OUTPATIENT
Start: 2024-07-13 | End: 2024-07-13

## 2024-07-13 RX ORDER — LOPERAMIDE HYDROCHLORIDE 2 MG/1
4 CAPSULE ORAL EVERY 2 HOUR PRN
Status: DISCONTINUED | OUTPATIENT
Start: 2024-07-13 | End: 2024-07-14 | Stop reason: HOSPADM

## 2024-07-13 RX ORDER — LIDOCAINE HYDROCHLORIDE 10 MG/ML
0.5 INJECTION INFILTRATION; PERINEURAL ONCE AS NEEDED
Status: DISCONTINUED | OUTPATIENT
Start: 2024-07-13 | End: 2024-07-14

## 2024-07-13 RX ORDER — LABETALOL 100 MG/1
400 TABLET, FILM COATED ORAL EVERY 12 HOURS
Status: DISCONTINUED | OUTPATIENT
Start: 2024-07-14 | End: 2024-07-16

## 2024-07-13 RX ORDER — OXYTOCIN/0.9 % SODIUM CHLORIDE 30/500 ML
60 PLASTIC BAG, INJECTION (ML) INTRAVENOUS ONCE AS NEEDED
Status: DISCONTINUED | OUTPATIENT
Start: 2024-07-13 | End: 2024-07-14 | Stop reason: HOSPADM

## 2024-07-13 RX ORDER — CARBOPROST TROMETHAMINE 250 UG/ML
250 INJECTION, SOLUTION INTRAMUSCULAR ONCE AS NEEDED
Status: COMPLETED | OUTPATIENT
Start: 2024-07-13 | End: 2024-07-13

## 2024-07-13 RX ORDER — PHENYLEPHRINE HCL IN 0.9% NACL 0.4MG/10ML
SYRINGE (ML) INTRAVENOUS AS NEEDED
Status: DISCONTINUED | OUTPATIENT
Start: 2024-07-13 | End: 2024-07-14

## 2024-07-13 RX ORDER — LABETALOL HYDROCHLORIDE 5 MG/ML
80 INJECTION, SOLUTION INTRAVENOUS ONCE
Status: COMPLETED | OUTPATIENT
Start: 2024-07-13 | End: 2024-07-13

## 2024-07-13 RX ORDER — CEFAZOLIN 1 G/1
INJECTION, POWDER, FOR SOLUTION INTRAVENOUS AS NEEDED
Status: DISCONTINUED | OUTPATIENT
Start: 2024-07-13 | End: 2024-07-14

## 2024-07-13 RX ORDER — SODIUM CHLORIDE, SODIUM LACTATE, POTASSIUM CHLORIDE, CALCIUM CHLORIDE 600; 310; 30; 20 MG/100ML; MG/100ML; MG/100ML; MG/100ML
125 INJECTION, SOLUTION INTRAVENOUS CONTINUOUS
Status: DISCONTINUED | OUTPATIENT
Start: 2024-07-13 | End: 2024-07-13

## 2024-07-13 RX ORDER — TRANEXAMIC ACID 100 MG/ML
1000 INJECTION, SOLUTION INTRAVENOUS ONCE AS NEEDED
Status: DISCONTINUED | OUTPATIENT
Start: 2024-07-13 | End: 2024-07-14 | Stop reason: HOSPADM

## 2024-07-13 RX ORDER — METOCLOPRAMIDE HYDROCHLORIDE 5 MG/ML
10 INJECTION INTRAMUSCULAR; INTRAVENOUS EVERY 6 HOURS PRN
Status: DISCONTINUED | OUTPATIENT
Start: 2024-07-13 | End: 2024-07-14 | Stop reason: HOSPADM

## 2024-07-13 RX ORDER — MAGNESIUM SULFATE HEPTAHYDRATE 40 MG/ML
INJECTION, SOLUTION INTRAVENOUS
Status: COMPLETED
Start: 2024-07-13 | End: 2024-07-13

## 2024-07-13 RX ORDER — LABETALOL HYDROCHLORIDE 5 MG/ML
20 INJECTION, SOLUTION INTRAVENOUS ONCE AS NEEDED
Status: DISCONTINUED | OUTPATIENT
Start: 2024-07-13 | End: 2024-07-14 | Stop reason: HOSPADM

## 2024-07-13 RX ORDER — CALCIUM GLUCONATE 98 MG/ML
1 INJECTION, SOLUTION INTRAVENOUS ONCE AS NEEDED
Status: DISCONTINUED | OUTPATIENT
Start: 2024-07-13 | End: 2024-07-17 | Stop reason: HOSPADM

## 2024-07-13 RX ORDER — LABETALOL HYDROCHLORIDE 5 MG/ML
20 INJECTION, SOLUTION INTRAVENOUS ONCE AS NEEDED
Status: COMPLETED | OUTPATIENT
Start: 2024-07-13 | End: 2024-07-13

## 2024-07-13 RX ORDER — METHYLERGONOVINE MALEATE 0.2 MG/ML
0.2 INJECTION INTRAVENOUS ONCE AS NEEDED
Status: DISCONTINUED | OUTPATIENT
Start: 2024-07-13 | End: 2024-07-14 | Stop reason: HOSPADM

## 2024-07-13 RX ORDER — HYDRALAZINE HYDROCHLORIDE 20 MG/ML
10 INJECTION INTRAMUSCULAR; INTRAVENOUS ONCE
Status: COMPLETED | OUTPATIENT
Start: 2024-07-13 | End: 2024-07-13

## 2024-07-13 RX ORDER — SUCCINYLCHOLINE CHLORIDE 100 MG/5ML
SYRINGE (ML) INTRAVENOUS AS NEEDED
Status: DISCONTINUED | OUTPATIENT
Start: 2024-07-13 | End: 2024-07-14

## 2024-07-13 RX ORDER — SODIUM CHLORIDE, SODIUM LACTATE, POTASSIUM CHLORIDE, CALCIUM CHLORIDE 600; 310; 30; 20 MG/100ML; MG/100ML; MG/100ML; MG/100ML
75 INJECTION, SOLUTION INTRAVENOUS CONTINUOUS
Status: DISCONTINUED | OUTPATIENT
Start: 2024-07-13 | End: 2024-07-15

## 2024-07-13 RX ORDER — HYDRALAZINE HYDROCHLORIDE 20 MG/ML
5 INJECTION INTRAMUSCULAR; INTRAVENOUS ONCE AS NEEDED
Status: DISCONTINUED | OUTPATIENT
Start: 2024-07-13 | End: 2024-07-14

## 2024-07-13 RX ORDER — ONDANSETRON 4 MG/1
4 TABLET, FILM COATED ORAL EVERY 6 HOURS PRN
Status: DISCONTINUED | OUTPATIENT
Start: 2024-07-13 | End: 2024-07-14 | Stop reason: HOSPADM

## 2024-07-13 RX ORDER — LABETALOL HYDROCHLORIDE 5 MG/ML
INJECTION, SOLUTION INTRAVENOUS
Status: DISPENSED
Start: 2024-07-13 | End: 2024-07-14

## 2024-07-13 RX ORDER — OXYTOCIN 10 [USP'U]/ML
10 INJECTION, SOLUTION INTRAMUSCULAR; INTRAVENOUS ONCE AS NEEDED
Status: DISCONTINUED | OUTPATIENT
Start: 2024-07-13 | End: 2024-07-14 | Stop reason: HOSPADM

## 2024-07-13 RX ORDER — LIDOCAINE HYDROCHLORIDE 10 MG/ML
30 INJECTION INFILTRATION; PERINEURAL ONCE AS NEEDED
Status: DISCONTINUED | OUTPATIENT
Start: 2024-07-13 | End: 2024-07-14 | Stop reason: HOSPADM

## 2024-07-13 RX ORDER — LABETALOL 100 MG/1
200 TABLET, FILM COATED ORAL ONCE
Status: COMPLETED | OUTPATIENT
Start: 2024-07-13 | End: 2024-07-13

## 2024-07-13 RX ORDER — ESMOLOL HYDROCHLORIDE 10 MG/ML
INJECTION INTRAVENOUS AS NEEDED
Status: DISCONTINUED | OUTPATIENT
Start: 2024-07-13 | End: 2024-07-14

## 2024-07-13 RX ORDER — ONDANSETRON HYDROCHLORIDE 2 MG/ML
4 INJECTION, SOLUTION INTRAVENOUS EVERY 6 HOURS PRN
Status: DISCONTINUED | OUTPATIENT
Start: 2024-07-13 | End: 2024-07-13 | Stop reason: SDUPTHER

## 2024-07-13 RX ORDER — METOCLOPRAMIDE 10 MG/1
10 TABLET ORAL EVERY 6 HOURS PRN
Status: DISCONTINUED | OUTPATIENT
Start: 2024-07-13 | End: 2024-07-14 | Stop reason: HOSPADM

## 2024-07-13 RX ORDER — NIFEDIPINE 30 MG/1
30 TABLET, FILM COATED, EXTENDED RELEASE ORAL ONCE
Status: COMPLETED | OUTPATIENT
Start: 2024-07-13 | End: 2024-07-13

## 2024-07-13 RX ORDER — NIFEDIPINE 10 MG/1
CAPSULE ORAL
Status: COMPLETED
Start: 2024-07-13 | End: 2024-07-13

## 2024-07-13 RX ORDER — ONDANSETRON 4 MG/1
4 TABLET, FILM COATED ORAL EVERY 6 HOURS PRN
Status: DISCONTINUED | OUTPATIENT
Start: 2024-07-13 | End: 2024-07-13 | Stop reason: SDUPTHER

## 2024-07-13 RX ORDER — MAGNESIUM SULFATE HEPTAHYDRATE 40 MG/ML
2 INJECTION, SOLUTION INTRAVENOUS CONTINUOUS
Status: DISCONTINUED | OUTPATIENT
Start: 2024-07-13 | End: 2024-07-15

## 2024-07-13 RX ORDER — MISOPROSTOL 200 UG/1
800 TABLET ORAL ONCE AS NEEDED
Status: DISCONTINUED | OUTPATIENT
Start: 2024-07-13 | End: 2024-07-14 | Stop reason: HOSPADM

## 2024-07-13 RX ORDER — PHENYLEPHRINE 10 MG/250 ML(40 MCG/ML)IN 0.9 % SOD.CHLORIDE INTRAVENOUS
CONTINUOUS PRN
Status: DISCONTINUED | OUTPATIENT
Start: 2024-07-13 | End: 2024-07-14

## 2024-07-13 RX ORDER — ONDANSETRON HYDROCHLORIDE 2 MG/ML
4 INJECTION, SOLUTION INTRAVENOUS EVERY 6 HOURS PRN
Status: DISCONTINUED | OUTPATIENT
Start: 2024-07-13 | End: 2024-07-14 | Stop reason: HOSPADM

## 2024-07-13 RX ORDER — NIFEDIPINE 10 MG/1
10 CAPSULE ORAL ONCE AS NEEDED
Status: COMPLETED | OUTPATIENT
Start: 2024-07-13 | End: 2024-07-13

## 2024-07-13 RX ORDER — LABETALOL 100 MG/1
200 TABLET, FILM COATED ORAL 2 TIMES DAILY
Status: DISCONTINUED | OUTPATIENT
Start: 2024-07-13 | End: 2024-07-13

## 2024-07-13 RX ORDER — BETAMETHASONE SODIUM PHOSPHATE AND BETAMETHASONE ACETATE 3; 3 MG/ML; MG/ML
12 INJECTION, SUSPENSION INTRA-ARTICULAR; INTRALESIONAL; INTRAMUSCULAR; SOFT TISSUE EVERY 24 HOURS
Status: DISCONTINUED | OUTPATIENT
Start: 2024-07-13 | End: 2024-07-13

## 2024-07-13 RX ORDER — PROPOFOL 10 MG/ML
INJECTION, EMULSION INTRAVENOUS AS NEEDED
Status: DISCONTINUED | OUTPATIENT
Start: 2024-07-13 | End: 2024-07-14

## 2024-07-13 RX ADMIN — SODIUM CHLORIDE, POTASSIUM CHLORIDE, SODIUM LACTATE AND CALCIUM CHLORIDE 75 ML/HR: 600; 310; 30; 20 INJECTION, SOLUTION INTRAVENOUS at 17:25

## 2024-07-13 RX ADMIN — ONDANSETRON 4 MG: 2 INJECTION, SOLUTION INTRAMUSCULAR; INTRAVENOUS at 23:29

## 2024-07-13 RX ADMIN — PROPOFOL 100 MG: 10 INJECTION, EMULSION INTRAVENOUS at 22:58

## 2024-07-13 RX ADMIN — Medication 0.59 MCG/KG/MIN: at 23:08

## 2024-07-13 RX ADMIN — Medication 160 MG: at 22:55

## 2024-07-13 RX ADMIN — OXYTOCIN 600 MILLI-UNITS/MIN: 10 INJECTION, SOLUTION INTRAMUSCULAR; INTRAVENOUS at 23:00

## 2024-07-13 RX ADMIN — SUGAMMADEX 200 MG: 100 INJECTION, SOLUTION INTRAVENOUS at 23:35

## 2024-07-13 RX ADMIN — MAGNESIUM SULFATE HEPTAHYDRATE 2 G/HR: 40 INJECTION, SOLUTION INTRAVENOUS at 17:49

## 2024-07-13 RX ADMIN — NIFEDIPINE 30 MG: 30 TABLET, FILM COATED, EXTENDED RELEASE ORAL at 17:22

## 2024-07-13 RX ADMIN — HYDRALAZINE HYDROCHLORIDE 10 MG: 20 INJECTION INTRAMUSCULAR; INTRAVENOUS at 17:27

## 2024-07-13 RX ADMIN — LABETALOL HYDROCHLORIDE 100 MG: 100 TABLET, FILM COATED ORAL at 19:59

## 2024-07-13 RX ADMIN — NIFEDIPINE: 10 CAPSULE ORAL at 17:02

## 2024-07-13 RX ADMIN — HYDROMORPHONE HYDROCHLORIDE 0.4 MG: 1 INJECTION, SOLUTION INTRAMUSCULAR; INTRAVENOUS; SUBCUTANEOUS at 23:31

## 2024-07-13 RX ADMIN — SODIUM CHLORIDE, SODIUM LACTATE, POTASSIUM CHLORIDE, AND CALCIUM CHLORIDE: 600; 310; 30; 20 INJECTION, SOLUTION INTRAVENOUS at 22:47

## 2024-07-13 RX ADMIN — HYDROMORPHONE HYDROCHLORIDE 0.4 MG: 1 INJECTION, SOLUTION INTRAMUSCULAR; INTRAVENOUS; SUBCUTANEOUS at 23:50

## 2024-07-13 RX ADMIN — CARBOPROST TROMETHAMINE 250 MCG: 250 INJECTION, SOLUTION INTRAMUSCULAR at 23:06

## 2024-07-13 RX ADMIN — CEFAZOLIN 2 G: 1 INJECTION, POWDER, FOR SOLUTION INTRAMUSCULAR; INTRAVENOUS at 22:55

## 2024-07-13 RX ADMIN — LABETALOL HYDROCHLORIDE 200 MG: 100 TABLET, FILM COATED ORAL at 21:58

## 2024-07-13 RX ADMIN — LABETALOL HYDROCHLORIDE 80 MG: 5 INJECTION, SOLUTION INTRAVENOUS at 21:33

## 2024-07-13 RX ADMIN — MAGNESIUM SULFATE HEPTAHYDRATE 20 G: 40 INJECTION, SOLUTION INTRAVENOUS at 17:27

## 2024-07-13 RX ADMIN — Medication 120 MCG: at 23:08

## 2024-07-13 RX ADMIN — ESMOLOL HYDROCHLORIDE 50 MG: 10 INJECTION, SOLUTION INTRAVENOUS at 22:58

## 2024-07-13 RX ADMIN — LABETALOL HYDROCHLORIDE 20 MG: 5 INJECTION, SOLUTION INTRAVENOUS at 18:39

## 2024-07-13 RX ADMIN — ETOMIDATE 16 MG: 20 INJECTION, SOLUTION INTRAVENOUS at 22:55

## 2024-07-13 RX ADMIN — HYDRALAZINE HYDROCHLORIDE 5 MG: 20 INJECTION INTRAMUSCULAR; INTRAVENOUS at 22:24

## 2024-07-13 RX ADMIN — ROCURONIUM 20 MG: 50 INJECTION, SOLUTION INTRAVENOUS at 23:08

## 2024-07-13 SDOH — ECONOMIC STABILITY: HOUSING INSECURITY: DO YOU FEEL UNSAFE GOING BACK TO THE PLACE WHERE YOU ARE LIVING?: NO

## 2024-07-13 SDOH — SOCIAL STABILITY: SOCIAL INSECURITY: HAVE YOU HAD THOUGHTS OF HARMING ANYONE ELSE?: NO

## 2024-07-13 SDOH — HEALTH STABILITY: MENTAL HEALTH: WISH TO BE DEAD (PAST 1 MONTH): NO

## 2024-07-13 SDOH — SOCIAL STABILITY: SOCIAL INSECURITY: HAS ANYONE EVER THREATENED TO HURT YOUR FAMILY OR YOUR PETS?: NO

## 2024-07-13 SDOH — HEALTH STABILITY: MENTAL HEALTH: NON-SPECIFIC ACTIVE SUICIDAL THOUGHTS (PAST 1 MONTH): NO

## 2024-07-13 SDOH — HEALTH STABILITY: MENTAL HEALTH: CURRENT SMOKER: 0

## 2024-07-13 SDOH — HEALTH STABILITY: MENTAL HEALTH: SUICIDAL BEHAVIOR (LIFETIME): NO

## 2024-07-13 SDOH — SOCIAL STABILITY: SOCIAL INSECURITY: DO YOU FEEL ANYONE HAS EXPLOITED OR TAKEN ADVANTAGE OF YOU FINANCIALLY OR OF YOUR PERSONAL PROPERTY?: NO

## 2024-07-13 SDOH — SOCIAL STABILITY: SOCIAL INSECURITY: DOES ANYONE TRY TO KEEP YOU FROM HAVING/CONTACTING OTHER FRIENDS OR DOING THINGS OUTSIDE YOUR HOME?: NO

## 2024-07-13 SDOH — SOCIAL STABILITY: SOCIAL INSECURITY: ARE YOU OR HAVE YOU BEEN THREATENED OR ABUSED PHYSICALLY, EMOTIONALLY, OR SEXUALLY BY ANYONE?: NO

## 2024-07-13 SDOH — HEALTH STABILITY: MENTAL HEALTH: HAVE YOU USED ANY SUBSTANCES (CANABIS, COCAINE, HEROIN, HALLUCINOGENS, INHALANTS, ETC.) IN THE PAST 12 MONTHS?: NO

## 2024-07-13 SDOH — SOCIAL STABILITY: SOCIAL INSECURITY: ARE THERE ANY APPARENT SIGNS OF INJURIES/BEHAVIORS THAT COULD BE RELATED TO ABUSE/NEGLECT?: NO

## 2024-07-13 SDOH — SOCIAL STABILITY: SOCIAL INSECURITY: PHYSICAL ABUSE: DENIES

## 2024-07-13 SDOH — HEALTH STABILITY: MENTAL HEALTH: WERE YOU ABLE TO COMPLETE ALL THE BEHAVIORAL HEALTH SCREENINGS?: YES

## 2024-07-13 SDOH — SOCIAL STABILITY: SOCIAL INSECURITY: VERBAL ABUSE: DENIES

## 2024-07-13 SDOH — SOCIAL STABILITY: SOCIAL INSECURITY: HAVE YOU HAD ANY THOUGHTS OF HARMING ANYONE ELSE?: NO

## 2024-07-13 SDOH — SOCIAL STABILITY: SOCIAL INSECURITY: ABUSE SCREEN: ADULT

## 2024-07-13 SDOH — HEALTH STABILITY: MENTAL HEALTH: HAVE YOU USED ANY PRESCRIPTION DRUGS OTHER THAN PRESCRIBED IN THE PAST 12 MONTHS?: NO

## 2024-07-13 ASSESSMENT — PAIN SCALES - GENERAL
PAINLEVEL_OUTOF10: 4
PAINLEVEL_OUTOF10: 2
PAINLEVEL_OUTOF10: 5 - MODERATE PAIN
PAINLEVEL_OUTOF10: 4
PAINLEVEL_OUTOF10: 2

## 2024-07-13 ASSESSMENT — PATIENT HEALTH QUESTIONNAIRE - PHQ9
2. FEELING DOWN, DEPRESSED OR HOPELESS: NOT AT ALL
1. LITTLE INTEREST OR PLEASURE IN DOING THINGS: NOT AT ALL
2. FEELING DOWN, DEPRESSED OR HOPELESS: NOT AT ALL
SUM OF ALL RESPONSES TO PHQ9 QUESTIONS 1 & 2: 0
SUM OF ALL RESPONSES TO PHQ9 QUESTIONS 1 & 2: 0
1. LITTLE INTEREST OR PLEASURE IN DOING THINGS: NOT AT ALL

## 2024-07-13 ASSESSMENT — LIFESTYLE VARIABLES
HOW MANY STANDARD DRINKS CONTAINING ALCOHOL DO YOU HAVE ON A TYPICAL DAY: PATIENT DOES NOT DRINK
AUDIT-C TOTAL SCORE: 0
AUDIT-C TOTAL SCORE: 0
SKIP TO QUESTIONS 9-10: 1
HOW MANY STANDARD DRINKS CONTAINING ALCOHOL DO YOU HAVE ON A TYPICAL DAY: PATIENT DOES NOT DRINK
HOW OFTEN DO YOU HAVE A DRINK CONTAINING ALCOHOL: NEVER
AUDIT-C TOTAL SCORE: 0
SKIP TO QUESTIONS 9-10: 1
HOW OFTEN DO YOU HAVE A DRINK CONTAINING ALCOHOL: NEVER
HOW OFTEN DO YOU HAVE 6 OR MORE DRINKS ON ONE OCCASION: NEVER
HOW OFTEN DO YOU HAVE 6 OR MORE DRINKS ON ONE OCCASION: NEVER
AUDIT-C TOTAL SCORE: 0

## 2024-07-13 ASSESSMENT — ACTIVITIES OF DAILY LIVING (ADL): LACK_OF_TRANSPORTATION: PATIENT DECLINED

## 2024-07-13 NOTE — H&P
Obstetrical Admission History and Physical     Chapo Martinez is a 33 y.o.  at 36w0d. KOKO: 8/10/2024, by Last Menstrual Period. Estimated fetal weight: 5 lb 6 oz. She has had prenatal care with Dr. Sawyer .    Chief Complaint: Chest Pain    Assessment/Plan    sPEC  -Diagnosed by severe range BPs requiring IV treatment  -Initial /137 -> immediate recheck 246/137. BP treated immediately with IR Nifedipine 10mg while IV access was obtained. Repeat BP after IR nifedipine 230/133. Given persistent severe BP elevation, was then treated with IV hydral 10mg with subsequent normalization in BP. Has since needed an additional dose of 20mg IV labetalol (last at 1840 on )  -Nifed XL 30mg started at 1730 on   -HELLP labs notable for ALT//289 and . Platelets 207 though does appear hemoconcentrated. Not currently meeting criteria for HELLP syndrome. To repeat in 4 hours if still at [facility]   -P:C pending  -Mild HA, neuro exam intact  -Mg @ 2g/hr with strict I/O's. Initial Cr 0.82    Troponemia  -Initially presented with crushing chest pain -> now improving with treatment of BPs  -EKG reviewed with ED attending, NSR without ST or T wave changes  -Initial trop 21, repeat pending  -BNP 52  -Suspect demand ischemia in the setting of severely elevated BP, to continue to trend trops as above    Fetal Well-Being  -PNLs reviewed and notable only for rubella equivocal, otherwise WNL including 1hr GCT  -s/p risk-reducing cfDNA and normal anatomy survey  -GBS collected   -Reviewed r/b of BMZ, declines at this time  -CEFM, cat 1 currently  -SVE 0.5/50/-2, vertex confirmed on BSUS   -Discussed recommendation for delivery given sPEC at 36 weeks. BPs responding well to treatment. Feel reasonable to attempt IOL though did discuss pCS should her condition deteriorate. Last ate at 1600. Will keep NPO while awaiting transport. Should transport be delayed, would offer CRB insertion at [facility] to expedite IOL.  "    Dispo: discussed recommendation for transfer to [facility] for higher level of care. Patient and  agreeable to transfer. Transfer accepted by Dr. Ramsay at [facility]. Transport ETA  this evening    60 minutes were spent in the care of this patient.    Rebecca Lenz MD     Active Problems:  There are no active Hospital Problems.      Pregnancy Problems (from 24 to present)       No problems associated with this episode.          Admit for inpatient care.  Diagnosis:   Diagnosed based on: severe range blood pressures requiring immediate treatment  Blood pressure goal: <160/110  Short acting medications received? IR Nifedipine 10mg, IV Hydral 10mg  Long acting antihypertensive: XL Nifedipine 30mg  HELLP labs: abnormal: ALT/AST  278/289 and    Protein:Cr ratio: pending  Delivery planning: IOL   corticosteroids:  offered, declined after discussion   Magnesium for seizure prophylaxis: indicated  GBS prophylaxis: indicated    Subjective   Chapo is a 33 year old G1 at 36w0d by LMP c/w 13 week scan presenting with crushing chest pain. Developed substernal chest pain while in the shower an hour ago. Pain described as \"elephant on the chest\" and radiates up to her neck and bilateral shoulders. Feels like she can't take a deep breath because of the pressure. Also feels like her heart is pounding. Mild HA, no vision changes. Does have some epigastric/RUQ discomfort but chest pain is worse. Denies history of HTN. Good FM. Occasional cramps. Denies VB or LOF.     Pregnancy notable for:  -Class 1 obesity    OBHx: G1  GynHx: denies STIs  PMHx: denies  SurgHx: denies  Meds: PNV  Allergies: NKDA  Social: denies t/e/I  FHx: father with HTN    Obstetrical History   OB History    Para Term  AB Living   1             SAB IAB Ectopic Multiple Live Births                  # Outcome Date GA Lbr Javi/2nd Weight Sex Type Anes PTL Lv   1 Current                Past Medical History  History " reviewed. No pertinent past medical history.     Past Surgical History   Past Surgical History:   Procedure Laterality Date    NO PAST SURGERIES         Social History  Social History     Tobacco Use    Smoking status: Never     Passive exposure: Never    Smokeless tobacco: Never   Substance Use Topics    Alcohol use: Not Currently     Substance and Sexual Activity   Drug Use Never       Allergies  Patient has no known allergies.     Medications  Medications Prior to Admission   Medication Sig Dispense Refill Last Dose    prenatal vit,calc76/iron/folic (PRENATABS RX ORAL) Take by mouth.          Objective    Last Vitals  Temp Pulse Resp BP MAP O2 Sat     71   (!) 246/137   (!) 93 %     Physical Examination  Gen: awake, alert, in mild distress  Head: NCAT  HEENT: moist mucus membranes  Pulm: breathing comfortably on room air, diminished breath sounds at the bases, otherwise CTA  CV: warm and well-perfused, RRR  Abd: gravid, mildly tender in RUQ, 5 lb 6 oz by [place]  Ext: trace pedal edema  Neuro: alert and oriented x4, CNs II-XII intact, strength 5/5 in upper and lower extremities bilaterally, sensation grossly intact, DTRs 1+  Psych: appropriate affect     FHT: 130, moderate variability, + accels, -decels  TOCO: quiet  SVE: 0.5/50/-3  BSUS: cephalic    Lab Review  Labs in chart were reviewed.

## 2024-07-13 NOTE — CARE PLAN
The patient's goals for the shift include remain stable for transfer.    The clinical goals for the shift include maintain stable blood pressures.    Over the shift, the patient did make progress toward the following goals. Patient was transferred to [facility] via [facility] due to a diagnosis of pre-eclampsia with severe features. After a repeat troponin and EKG, the patient was cleared for transport per cardiology and OB. Vitals and assessment were completed prior to transfer. All questions and concerns were addressed with the patient. Report was called to ANGELITA Garcia at [facility].

## 2024-07-13 NOTE — PROCEDURES
Chapo Martinez, a  at 36w0d with an KOKO of 8/10/2024, by Last Menstrual Period, was seen at [facility] for a nonstress test.    Non-Stress Test   Baseline Fetal Heart Rate for Non-Stress Test: 130 BPM  Variability in Waveform for Non-Stress Test: Moderate  Accelerations in Non-Stress Test: Yes  Decelerations in Non-Stress Test: None  Contractions in Non-Stress Test: Not present  Acoustic Stimulator for Non-Stress Test: No  Interpretation of Non-Stress Test   Interpretation of Non-Stress Test: Reactive      Rebecca Lenz MD

## 2024-07-14 ENCOUNTER — APPOINTMENT (OUTPATIENT)
Dept: CARDIOLOGY | Facility: HOSPITAL | Age: 34
End: 2024-07-14
Payer: COMMERCIAL

## 2024-07-14 VITALS
BODY MASS INDEX: 33.79 KG/M2 | HEIGHT: 63 IN | OXYGEN SATURATION: 96 % | DIASTOLIC BLOOD PRESSURE: 69 MMHG | TEMPERATURE: 98.1 F | WEIGHT: 190.7 LBS | RESPIRATION RATE: 18 BRPM | SYSTOLIC BLOOD PRESSURE: 123 MMHG | HEART RATE: 87 BPM

## 2024-07-14 LAB
ALBUMIN SERPL BCP-MCNC: 2.5 G/DL (ref 3.4–5)
ALBUMIN SERPL BCP-MCNC: 2.6 G/DL (ref 3.4–5)
ALP SERPL-CCNC: 112 U/L (ref 33–110)
ALP SERPL-CCNC: 115 U/L (ref 33–110)
ALP SERPL-CCNC: 117 U/L (ref 33–110)
ALP SERPL-CCNC: 129 U/L (ref 33–110)
ALT SERPL W P-5'-P-CCNC: 536 U/L (ref 7–45)
ALT SERPL W P-5'-P-CCNC: 583 U/L (ref 7–45)
ALT SERPL W P-5'-P-CCNC: 712 U/L (ref 7–45)
ALT SERPL W P-5'-P-CCNC: 839 U/L (ref 7–45)
ANION GAP BLDA CALCULATED.4IONS-SCNC: 14 MMO/L (ref 10–25)
ANION GAP SERPL CALC-SCNC: 13 MMOL/L (ref 10–20)
ANION GAP SERPL CALC-SCNC: 14 MMOL/L (ref 10–20)
APTT PPP: 26 SECONDS (ref 27–38)
APTT PPP: 27 SECONDS (ref 27–38)
APTT PPP: 27 SECONDS (ref 27–38)
AST SERPL W P-5'-P-CCNC: 391 U/L (ref 9–39)
AST SERPL W P-5'-P-CCNC: 501 U/L (ref 9–39)
AST SERPL W P-5'-P-CCNC: 797 U/L (ref 9–39)
AST SERPL W P-5'-P-CCNC: 997 U/L (ref 9–39)
BASE EXCESS BLDA CALC-SCNC: -7.7 MMOL/L (ref -2–3)
BASOPHILS # BLD AUTO: 0.01 X10*3/UL (ref 0–0.1)
BASOPHILS # BLD AUTO: 0.02 X10*3/UL (ref 0–0.1)
BASOPHILS # BLD AUTO: 0.02 X10*3/UL (ref 0–0.1)
BASOPHILS NFR BLD AUTO: 0.1 %
BILIRUB DIRECT SERPL-MCNC: 0.2 MG/DL (ref 0–0.3)
BILIRUB SERPL-MCNC: 0.6 MG/DL (ref 0–1.2)
BILIRUB SERPL-MCNC: 0.8 MG/DL (ref 0–1.2)
BILIRUB SERPL-MCNC: 1 MG/DL (ref 0–1.2)
BILIRUB SERPL-MCNC: 1.2 MG/DL (ref 0–1.2)
BLOOD EXPIRATION DATE: NORMAL
BODY SURFACE AREA: 1.96 M2
BODY TEMPERATURE: 37 DEGREES CELSIUS
BUN SERPL-MCNC: 12 MG/DL (ref 6–23)
BUN SERPL-MCNC: 15 MG/DL (ref 6–23)
BUN SERPL-MCNC: 16 MG/DL (ref 6–23)
BUN SERPL-MCNC: 16 MG/DL (ref 6–23)
CA-I BLDA-SCNC: 1.17 MMOL/L (ref 1.1–1.33)
CALCIUM SERPL-MCNC: 6.8 MG/DL (ref 8.6–10.6)
CALCIUM SERPL-MCNC: 6.8 MG/DL (ref 8.6–10.6)
CALCIUM SERPL-MCNC: 7.4 MG/DL (ref 8.6–10.6)
CALCIUM SERPL-MCNC: 7.8 MG/DL (ref 8.6–10.6)
CARDIAC TROPONIN I PNL SERPL HS: 275 NG/L (ref 0–34)
CARDIAC TROPONIN I PNL SERPL HS: 67 NG/L (ref 0–34)
CARDIAC TROPONIN I PNL SERPL HS: 99 NG/L (ref 0–34)
CHLORIDE BLDA-SCNC: 100 MMOL/L (ref 98–107)
CHLORIDE SERPL-SCNC: 100 MMOL/L (ref 98–107)
CHLORIDE SERPL-SCNC: 101 MMOL/L (ref 98–107)
CHLORIDE SERPL-SCNC: 104 MMOL/L (ref 98–107)
CHLORIDE SERPL-SCNC: 104 MMOL/L (ref 98–107)
CO2 SERPL-SCNC: 19 MMOL/L (ref 21–32)
CO2 SERPL-SCNC: 20 MMOL/L (ref 21–32)
CO2 SERPL-SCNC: 20 MMOL/L (ref 21–32)
CO2 SERPL-SCNC: 22 MMOL/L (ref 21–32)
CREAT SERPL-MCNC: 0.64 MG/DL (ref 0.5–1.05)
CREAT SERPL-MCNC: 0.65 MG/DL (ref 0.5–1.05)
D DIMER PPP FEU-MCNC: ABNORMAL NG/ML FEU
DISPENSE STATUS: NORMAL
EGFRCR SERPLBLD CKD-EPI 2021: >90 ML/MIN/1.73M*2
EOSINOPHIL # BLD AUTO: 0 X10*3/UL (ref 0–0.7)
EOSINOPHIL # BLD AUTO: 0.01 X10*3/UL (ref 0–0.7)
EOSINOPHIL # BLD AUTO: 0.01 X10*3/UL (ref 0–0.7)
EOSINOPHIL NFR BLD AUTO: 0 %
EOSINOPHIL NFR BLD AUTO: 0.1 %
EOSINOPHIL NFR BLD AUTO: 0.1 %
ERYTHROCYTE [DISTWIDTH] IN BLOOD BY AUTOMATED COUNT: 12.9 % (ref 11.5–14.5)
ERYTHROCYTE [DISTWIDTH] IN BLOOD BY AUTOMATED COUNT: 13 % (ref 11.5–14.5)
ERYTHROCYTE [DISTWIDTH] IN BLOOD BY AUTOMATED COUNT: 13.1 % (ref 11.5–14.5)
ERYTHROCYTE [DISTWIDTH] IN BLOOD BY AUTOMATED COUNT: 13.2 % (ref 11.5–14.5)
FIBRINOGEN PPP-MCNC: 271 MG/DL (ref 200–400)
FIBRINOGEN PPP-MCNC: 275 MG/DL (ref 200–400)
FIBRINOGEN PPP-MCNC: 287 MG/DL (ref 200–400)
FIBRINOGEN PPP-MCNC: 371 MG/DL (ref 200–400)
GLUCOSE BLDA-MCNC: 152 MG/DL (ref 74–99)
GLUCOSE SERPL-MCNC: 101 MG/DL (ref 74–99)
GLUCOSE SERPL-MCNC: 108 MG/DL (ref 74–99)
GLUCOSE SERPL-MCNC: 110 MG/DL (ref 74–99)
GLUCOSE SERPL-MCNC: 129 MG/DL (ref 74–99)
HCO3 BLDA-SCNC: 18.8 MMOL/L (ref 22–26)
HCT VFR BLD AUTO: 27.9 % (ref 36–46)
HCT VFR BLD AUTO: 30.1 % (ref 36–46)
HCT VFR BLD AUTO: 31.8 % (ref 36–46)
HCT VFR BLD AUTO: 36.7 % (ref 36–46)
HCT VFR BLD EST: 47 % (ref 36–46)
HGB BLD-MCNC: 10.2 G/DL (ref 12–16)
HGB BLD-MCNC: 11 G/DL (ref 12–16)
HGB BLD-MCNC: 12 G/DL (ref 12–16)
HGB BLD-MCNC: 9.5 G/DL (ref 12–16)
HGB BLDA-MCNC: 15.8 G/DL (ref 12–16)
IMM GRANULOCYTES # BLD AUTO: 0.06 X10*3/UL (ref 0–0.7)
IMM GRANULOCYTES # BLD AUTO: 0.07 X10*3/UL (ref 0–0.7)
IMM GRANULOCYTES # BLD AUTO: 0.19 X10*3/UL (ref 0–0.7)
IMM GRANULOCYTES NFR BLD AUTO: 0.5 % (ref 0–0.9)
IMM GRANULOCYTES NFR BLD AUTO: 0.5 % (ref 0–0.9)
IMM GRANULOCYTES NFR BLD AUTO: 1.4 % (ref 0–0.9)
INHALED O2 CONCENTRATION: 60 %
INR PPP: 0.8 (ref 0.9–1.1)
INR PPP: 0.9 (ref 0.9–1.1)
LACTATE BLDA-SCNC: 3.5 MMOL/L (ref 0.4–2)
LACTATE SERPL-SCNC: 1.8 MMOL/L (ref 0.4–2)
LACTATE SERPL-SCNC: 2.5 MMOL/L (ref 0.4–2)
LDH SERPL L TO P-CCNC: 1186 U/L (ref 84–246)
LDH SERPL L TO P-CCNC: 561 U/L (ref 84–246)
LDH SERPL L TO P-CCNC: 664 U/L (ref 84–246)
LDH SERPL L TO P-CCNC: 940 U/L (ref 84–246)
LYMPHOCYTES # BLD AUTO: 1.61 X10*3/UL (ref 1.2–4.8)
LYMPHOCYTES # BLD AUTO: 1.84 X10*3/UL (ref 1.2–4.8)
LYMPHOCYTES # BLD AUTO: 1.92 X10*3/UL (ref 1.2–4.8)
LYMPHOCYTES NFR BLD AUTO: 12.4 %
LYMPHOCYTES NFR BLD AUTO: 12.5 %
LYMPHOCYTES NFR BLD AUTO: 13.7 %
MAGNESIUM SERPL-MCNC: 5.11 MG/DL (ref 1.6–2.4)
MCH RBC QN AUTO: 29.9 PG (ref 26–34)
MCH RBC QN AUTO: 29.9 PG (ref 26–34)
MCH RBC QN AUTO: 30.2 PG (ref 26–34)
MCH RBC QN AUTO: 30.8 PG (ref 26–34)
MCHC RBC AUTO-ENTMCNC: 32.7 G/DL (ref 32–36)
MCHC RBC AUTO-ENTMCNC: 33.9 G/DL (ref 32–36)
MCHC RBC AUTO-ENTMCNC: 34.1 G/DL (ref 32–36)
MCHC RBC AUTO-ENTMCNC: 34.6 G/DL (ref 32–36)
MCV RBC AUTO: 86 FL (ref 80–100)
MCV RBC AUTO: 88 FL (ref 80–100)
MCV RBC AUTO: 91 FL (ref 80–100)
MCV RBC AUTO: 92 FL (ref 80–100)
MONOCYTES # BLD AUTO: 0.73 X10*3/UL (ref 0.1–1)
MONOCYTES # BLD AUTO: 0.76 X10*3/UL (ref 0.1–1)
MONOCYTES # BLD AUTO: 1.1 X10*3/UL (ref 0.1–1)
MONOCYTES NFR BLD AUTO: 5.2 %
MONOCYTES NFR BLD AUTO: 5.2 %
MONOCYTES NFR BLD AUTO: 8.5 %
NEUTROPHILS # BLD AUTO: 10.18 X10*3/UL (ref 1.2–7.7)
NEUTROPHILS # BLD AUTO: 11.12 X10*3/UL (ref 1.2–7.7)
NEUTROPHILS # BLD AUTO: 12.04 X10*3/UL (ref 1.2–7.7)
NEUTROPHILS NFR BLD AUTO: 78.5 %
NEUTROPHILS NFR BLD AUTO: 79.5 %
NEUTROPHILS NFR BLD AUTO: 81.6 %
NRBC BLD-RTO: 0 /100 WBCS (ref 0–0)
OXYHGB MFR BLDA: 96.8 % (ref 94–98)
PCO2 BLDA: 41 MM HG (ref 38–42)
PH BLDA: 7.27 PH (ref 7.38–7.42)
PHOSPHATE SERPL-MCNC: 5 MG/DL (ref 2.5–4.9)
PLATELET # BLD AUTO: 57 X10*3/UL (ref 150–450)
PLATELET # BLD AUTO: 58 X10*3/UL (ref 150–450)
PLATELET # BLD AUTO: 59 X10*3/UL (ref 150–450)
PLATELET # BLD AUTO: 60 X10*3/UL (ref 150–450)
PO2 BLDA: 119 MM HG (ref 85–95)
POTASSIUM BLDA-SCNC: 4.4 MMOL/L (ref 3.5–5.3)
POTASSIUM SERPL-SCNC: 3.9 MMOL/L (ref 3.5–5.3)
POTASSIUM SERPL-SCNC: 4.2 MMOL/L (ref 3.5–5.3)
POTASSIUM SERPL-SCNC: 4.3 MMOL/L (ref 3.5–5.3)
POTASSIUM SERPL-SCNC: 4.4 MMOL/L (ref 3.5–5.3)
PRODUCT BLOOD TYPE: 600
PRODUCT BLOOD TYPE: 6200
PRODUCT CODE: NORMAL
PROT SERPL-MCNC: 4.8 G/DL (ref 6.4–8.2)
PROT SERPL-MCNC: 4.9 G/DL (ref 6.4–8.2)
PROTHROMBIN TIME: 10.2 SECONDS (ref 9.8–12.8)
PROTHROMBIN TIME: 9.2 SECONDS (ref 9.8–12.8)
PROTHROMBIN TIME: 9.7 SECONDS (ref 9.8–12.8)
PROTHROMBIN TIME: 9.8 SECONDS (ref 9.8–12.8)
RBC # BLD AUTO: 3.18 X10*6/UL (ref 4–5.2)
RBC # BLD AUTO: 3.31 X10*6/UL (ref 4–5.2)
RBC # BLD AUTO: 3.68 X10*6/UL (ref 4–5.2)
RBC # BLD AUTO: 3.97 X10*6/UL (ref 4–5.2)
SAO2 % BLDA: 99 % (ref 94–100)
SODIUM BLDA-SCNC: 128 MMOL/L (ref 136–145)
SODIUM SERPL-SCNC: 130 MMOL/L (ref 136–145)
SODIUM SERPL-SCNC: 131 MMOL/L (ref 136–145)
SODIUM SERPL-SCNC: 133 MMOL/L (ref 136–145)
SODIUM SERPL-SCNC: 133 MMOL/L (ref 136–145)
TREPONEMA PALLIDUM IGG+IGM AB [PRESENCE] IN SERUM OR PLASMA BY IMMUNOASSAY: NONREACTIVE
UNIT ABO: NORMAL
UNIT NUMBER: NORMAL
UNIT RH: NORMAL
UNIT VOLUME: 294
UNIT VOLUME: 325
UNIT VOLUME: 350
UNIT VOLUME: 350
UNIT VOLUME: 87
UNIT VOLUME: 99
WBC # BLD AUTO: 12.2 X10*3/UL (ref 4.4–11.3)
WBC # BLD AUTO: 13 X10*3/UL (ref 4.4–11.3)
WBC # BLD AUTO: 14 X10*3/UL (ref 4.4–11.3)
WBC # BLD AUTO: 14.7 X10*3/UL (ref 4.4–11.3)
XM INTEP: NORMAL
XM INTEP: NORMAL

## 2024-07-14 PROCEDURE — 85610 PROTHROMBIN TIME: CPT | Performed by: STUDENT IN AN ORGANIZED HEALTH CARE EDUCATION/TRAINING PROGRAM

## 2024-07-14 PROCEDURE — 2500000004 HC RX 250 GENERAL PHARMACY W/ HCPCS (ALT 636 FOR OP/ED)

## 2024-07-14 PROCEDURE — 84100 ASSAY OF PHOSPHORUS: CPT | Performed by: STUDENT IN AN ORGANIZED HEALTH CARE EDUCATION/TRAINING PROGRAM

## 2024-07-14 PROCEDURE — 2500000004 HC RX 250 GENERAL PHARMACY W/ HCPCS (ALT 636 FOR OP/ED): Performed by: STUDENT IN AN ORGANIZED HEALTH CARE EDUCATION/TRAINING PROGRAM

## 2024-07-14 PROCEDURE — 85610 PROTHROMBIN TIME: CPT

## 2024-07-14 PROCEDURE — 80053 COMPREHEN METABOLIC PANEL: CPT

## 2024-07-14 PROCEDURE — 99199 UNLISTED SPECIAL SVC PX/RPRT: CPT

## 2024-07-14 PROCEDURE — 85379 FIBRIN DEGRADATION QUANT: CPT | Performed by: STUDENT IN AN ORGANIZED HEALTH CARE EDUCATION/TRAINING PROGRAM

## 2024-07-14 PROCEDURE — 83605 ASSAY OF LACTIC ACID: CPT

## 2024-07-14 PROCEDURE — 7100000016 HC LABOR RECOVERY PER HOUR

## 2024-07-14 PROCEDURE — 83735 ASSAY OF MAGNESIUM: CPT | Performed by: STUDENT IN AN ORGANIZED HEALTH CARE EDUCATION/TRAINING PROGRAM

## 2024-07-14 PROCEDURE — 93308 TTE F-UP OR LMTD: CPT

## 2024-07-14 PROCEDURE — 80053 COMPREHEN METABOLIC PANEL: CPT | Performed by: STUDENT IN AN ORGANIZED HEALTH CARE EDUCATION/TRAINING PROGRAM

## 2024-07-14 PROCEDURE — 93308 TTE F-UP OR LMTD: CPT | Performed by: INTERNAL MEDICINE

## 2024-07-14 PROCEDURE — 93005 ELECTROCARDIOGRAM TRACING: CPT

## 2024-07-14 PROCEDURE — 83615 LACTATE (LD) (LDH) ENZYME: CPT | Performed by: STUDENT IN AN ORGANIZED HEALTH CARE EDUCATION/TRAINING PROGRAM

## 2024-07-14 PROCEDURE — 37799 UNLISTED PX VASCULAR SURGERY: CPT | Performed by: STUDENT IN AN ORGANIZED HEALTH CARE EDUCATION/TRAINING PROGRAM

## 2024-07-14 PROCEDURE — 82248 BILIRUBIN DIRECT: CPT | Performed by: STUDENT IN AN ORGANIZED HEALTH CARE EDUCATION/TRAINING PROGRAM

## 2024-07-14 PROCEDURE — 2500000001 HC RX 250 WO HCPCS SELF ADMINISTERED DRUGS (ALT 637 FOR MEDICARE OP): Performed by: STUDENT IN AN ORGANIZED HEALTH CARE EDUCATION/TRAINING PROGRAM

## 2024-07-14 PROCEDURE — 1210000001 HC SEMI-PRIVATE ROOM DAILY

## 2024-07-14 PROCEDURE — 59514 CESAREAN DELIVERY ONLY: CPT | Performed by: STUDENT IN AN ORGANIZED HEALTH CARE EDUCATION/TRAINING PROGRAM

## 2024-07-14 PROCEDURE — 93325 DOPPLER ECHO COLOR FLOW MAPG: CPT | Performed by: INTERNAL MEDICINE

## 2024-07-14 PROCEDURE — 84484 ASSAY OF TROPONIN QUANT: CPT

## 2024-07-14 PROCEDURE — 85384 FIBRINOGEN ACTIVITY: CPT | Performed by: STUDENT IN AN ORGANIZED HEALTH CARE EDUCATION/TRAINING PROGRAM

## 2024-07-14 PROCEDURE — 85025 COMPLETE CBC W/AUTO DIFF WBC: CPT

## 2024-07-14 PROCEDURE — 83615 LACTATE (LD) (LDH) ENZYME: CPT

## 2024-07-14 PROCEDURE — 83010 ASSAY OF HAPTOGLOBIN QUANT: CPT | Performed by: STUDENT IN AN ORGANIZED HEALTH CARE EDUCATION/TRAINING PROGRAM

## 2024-07-14 PROCEDURE — 85025 COMPLETE CBC W/AUTO DIFF WBC: CPT | Performed by: STUDENT IN AN ORGANIZED HEALTH CARE EDUCATION/TRAINING PROGRAM

## 2024-07-14 PROCEDURE — 37799 UNLISTED PX VASCULAR SURGERY: CPT

## 2024-07-14 PROCEDURE — 85384 FIBRINOGEN ACTIVITY: CPT

## 2024-07-14 RX ORDER — HEPARIN SODIUM 5000 [USP'U]/ML
5000 INJECTION, SOLUTION INTRAVENOUS; SUBCUTANEOUS EVERY 8 HOURS
Status: DISCONTINUED | OUTPATIENT
Start: 2024-07-14 | End: 2024-07-15

## 2024-07-14 RX ORDER — DIPHENHYDRAMINE HYDROCHLORIDE 50 MG/ML
25 INJECTION INTRAMUSCULAR; INTRAVENOUS EVERY 4 HOURS PRN
OUTPATIENT
Start: 2024-07-14

## 2024-07-14 RX ORDER — ONDANSETRON 4 MG/1
4 TABLET, FILM COATED ORAL EVERY 6 HOURS PRN
OUTPATIENT
Start: 2024-07-14

## 2024-07-14 RX ORDER — HYDROMORPHONE HYDROCHLORIDE 0.2 MG/ML
0.2 INJECTION INTRAMUSCULAR; INTRAVENOUS; SUBCUTANEOUS EVERY 5 MIN PRN
OUTPATIENT
Start: 2024-07-14

## 2024-07-14 RX ORDER — HYDROMORPHONE HCL/0.9% NACL/PF 15 MG/30ML
PATIENT CONTROLLED ANALGESIA SYRINGE INTRAVENOUS CONTINUOUS
Status: DISCONTINUED | OUTPATIENT
Start: 2024-07-14 | End: 2024-07-15

## 2024-07-14 RX ORDER — NALOXONE HYDROCHLORIDE 0.4 MG/ML
0.1 INJECTION, SOLUTION INTRAMUSCULAR; INTRAVENOUS; SUBCUTANEOUS EVERY 5 MIN PRN
OUTPATIENT
Start: 2024-07-14

## 2024-07-14 RX ORDER — HYDRALAZINE HYDROCHLORIDE 20 MG/ML
5 INJECTION INTRAMUSCULAR; INTRAVENOUS ONCE AS NEEDED
OUTPATIENT
Start: 2024-07-14

## 2024-07-14 RX ORDER — FENTANYL CITRATE 50 UG/ML
INJECTION, SOLUTION INTRAMUSCULAR; INTRAVENOUS CONTINUOUS PRN
Status: DISCONTINUED | OUTPATIENT
Start: 2024-07-14 | End: 2024-07-14

## 2024-07-14 RX ORDER — OXYTOCIN 10 [USP'U]/ML
10 INJECTION, SOLUTION INTRAMUSCULAR; INTRAVENOUS ONCE AS NEEDED
OUTPATIENT
Start: 2024-07-14

## 2024-07-14 RX ORDER — METHYLERGONOVINE MALEATE 0.2 MG/ML
0.2 INJECTION INTRAVENOUS ONCE AS NEEDED
OUTPATIENT
Start: 2024-07-14

## 2024-07-14 RX ORDER — OXYCODONE HYDROCHLORIDE 5 MG/1
10 TABLET ORAL EVERY 4 HOURS PRN
OUTPATIENT
Start: 2024-07-14

## 2024-07-14 RX ORDER — BISACODYL 10 MG/1
10 SUPPOSITORY RECTAL DAILY PRN
OUTPATIENT
Start: 2024-07-14

## 2024-07-14 RX ORDER — NIFEDIPINE 10 MG/1
10 CAPSULE ORAL ONCE AS NEEDED
OUTPATIENT
Start: 2024-07-14

## 2024-07-14 RX ORDER — ONDANSETRON HYDROCHLORIDE 2 MG/ML
4 INJECTION, SOLUTION INTRAVENOUS EVERY 6 HOURS PRN
OUTPATIENT
Start: 2024-07-14

## 2024-07-14 RX ORDER — POLYETHYLENE GLYCOL 3350 17 G/17G
17 POWDER, FOR SOLUTION ORAL 2 TIMES DAILY PRN
OUTPATIENT
Start: 2024-07-14

## 2024-07-14 RX ORDER — MISOPROSTOL 200 UG/1
800 TABLET ORAL ONCE AS NEEDED
OUTPATIENT
Start: 2024-07-14

## 2024-07-14 RX ORDER — NALOXONE HYDROCHLORIDE 0.4 MG/ML
0.2 INJECTION, SOLUTION INTRAMUSCULAR; INTRAVENOUS; SUBCUTANEOUS AS NEEDED
Status: DISCONTINUED | OUTPATIENT
Start: 2024-07-14 | End: 2024-07-17 | Stop reason: HOSPADM

## 2024-07-14 RX ORDER — TRANEXAMIC ACID 100 MG/ML
1000 INJECTION, SOLUTION INTRAVENOUS ONCE AS NEEDED
OUTPATIENT
Start: 2024-07-14

## 2024-07-14 RX ORDER — LABETALOL HYDROCHLORIDE 5 MG/ML
20 INJECTION, SOLUTION INTRAVENOUS ONCE AS NEEDED
OUTPATIENT
Start: 2024-07-14

## 2024-07-14 RX ORDER — LIDOCAINE 560 MG/1
1 PATCH PERCUTANEOUS; TOPICAL; TRANSDERMAL
OUTPATIENT
Start: 2024-07-14

## 2024-07-14 RX ORDER — HYDROMORPHONE HYDROCHLORIDE 1 MG/ML
0.2 INJECTION, SOLUTION INTRAMUSCULAR; INTRAVENOUS; SUBCUTANEOUS ONCE
Status: COMPLETED | OUTPATIENT
Start: 2024-07-14 | End: 2024-07-14

## 2024-07-14 RX ORDER — HEPARIN SODIUM 5000 [USP'U]/ML
5000 INJECTION, SOLUTION INTRAVENOUS; SUBCUTANEOUS EVERY 8 HOURS SCHEDULED
Status: DISCONTINUED | OUTPATIENT
Start: 2024-07-14 | End: 2024-07-17 | Stop reason: HOSPADM

## 2024-07-14 RX ORDER — DIPHENHYDRAMINE HCL 25 MG
25 CAPSULE ORAL EVERY 4 HOURS PRN
OUTPATIENT
Start: 2024-07-14

## 2024-07-14 RX ORDER — LOPERAMIDE HYDROCHLORIDE 2 MG/1
4 CAPSULE ORAL EVERY 2 HOUR PRN
OUTPATIENT
Start: 2024-07-14

## 2024-07-14 RX ORDER — CARBOPROST TROMETHAMINE 250 UG/ML
250 INJECTION, SOLUTION INTRAMUSCULAR ONCE AS NEEDED
OUTPATIENT
Start: 2024-07-14

## 2024-07-14 RX ORDER — OXYCODONE HYDROCHLORIDE 5 MG/1
5 TABLET ORAL EVERY 4 HOURS PRN
OUTPATIENT
Start: 2024-07-14

## 2024-07-14 RX ORDER — OXYTOCIN/0.9 % SODIUM CHLORIDE 30/500 ML
60 PLASTIC BAG, INJECTION (ML) INTRAVENOUS ONCE AS NEEDED
OUTPATIENT
Start: 2024-07-14

## 2024-07-14 RX ORDER — ADHESIVE BANDAGE
10 BANDAGE TOPICAL
OUTPATIENT
Start: 2024-07-14

## 2024-07-14 RX ORDER — SIMETHICONE 80 MG
80 TABLET,CHEWABLE ORAL 4 TIMES DAILY PRN
OUTPATIENT
Start: 2024-07-14

## 2024-07-14 RX ORDER — MAGNESIUM SULFATE HEPTAHYDRATE 40 MG/ML
INJECTION, SOLUTION INTRAVENOUS
Status: DISPENSED
Start: 2024-07-14 | End: 2024-07-14

## 2024-07-14 RX ADMIN — MAGNESIUM SULFATE HEPTAHYDRATE 2 G/HR: 40 INJECTION, SOLUTION INTRAVENOUS at 10:05

## 2024-07-14 RX ADMIN — SODIUM CHLORIDE, POTASSIUM CHLORIDE, SODIUM LACTATE AND CALCIUM CHLORIDE 75 ML/HR: 600; 310; 30; 20 INJECTION, SOLUTION INTRAVENOUS at 05:42

## 2024-07-14 RX ADMIN — HEPARIN SODIUM 5000 UNITS: 5000 INJECTION INTRAVENOUS; SUBCUTANEOUS at 13:54

## 2024-07-14 RX ADMIN — HYDROMORPHONE HYDROCHLORIDE 0.2 MG: 1 INJECTION, SOLUTION INTRAMUSCULAR; INTRAVENOUS; SUBCUTANEOUS at 01:09

## 2024-07-14 RX ADMIN — MAGNESIUM SULFATE HEPTAHYDRATE 2 G/HR: 40 INJECTION, SOLUTION INTRAVENOUS at 21:35

## 2024-07-14 RX ADMIN — LABETALOL HYDROCHLORIDE 400 MG: 100 TABLET, FILM COATED ORAL at 20:40

## 2024-07-14 RX ADMIN — Medication: at 01:53

## 2024-07-14 RX ADMIN — LABETALOL HYDROCHLORIDE 400 MG: 100 TABLET, FILM COATED ORAL at 08:40

## 2024-07-14 RX ADMIN — HYDROMORPHONE HYDROCHLORIDE 0.2 MG: 1 INJECTION, SOLUTION INTRAMUSCULAR; INTRAVENOUS; SUBCUTANEOUS at 00:18

## 2024-07-14 RX ADMIN — HEPARIN SODIUM 5000 UNITS: 5000 INJECTION INTRAVENOUS; SUBCUTANEOUS at 22:07

## 2024-07-14 ASSESSMENT — RESPIRATORY DISTRESS OBSERVATION SCALE (RDOS)
GRUNTING AT END OF EXPIRATION: 0 - NONE
RESTLESS NONPURPOSEFUL MOVEMENTS: 0 - NONE
LOOK OF FEAR: 0 - NONE
INVOLUNTARY NASAL FLARING: 0 - NONE
RDOS TOTAL SCORE: 0
RESTLESS NONPURPOSEFUL MOVEMENTS: 0 - NONE
PARADOXICAL BREATHING PATTERN: 0 - NONE
RESPIRATORY RATE PER MINUTE: 0 - <19 BREATHS
HEART RATE PER MINUTE: 0 - <90 BEATS
ACCESSORY MUSCLE RISE IN CLAVICLE DURING INSPIRATION: 0 - NONE
RESTLESS NONPURPOSEFUL MOVEMENTS: 0 - NONE
RESPIRATORY RATE PER MINUTE: 0 - <19 BREATHS
RDOS TOTAL SCORE: 0
GRUNTING AT END OF EXPIRATION: 0 - NONE
RESPIRATORY RATE PER MINUTE: 0 - <19 BREATHS
HEART RATE PER MINUTE: 0 - <90 BEATS
INVOLUNTARY NASAL FLARING: 0 - NONE
RDOS TOTAL SCORE: 0
LOOK OF FEAR: 0 - NONE
LOOK OF FEAR: 0 - NONE
PARADOXICAL BREATHING PATTERN: 0 - NONE
GRUNTING AT END OF EXPIRATION: 0 - NONE
GRUNTING AT END OF EXPIRATION: 0 - NONE
INVOLUNTARY NASAL FLARING: 0 - NONE
RESPIRATORY RATE PER MINUTE: 0 - <19 BREATHS
RESTLESS NONPURPOSEFUL MOVEMENTS: 0 - NONE
ACCESSORY MUSCLE RISE IN CLAVICLE DURING INSPIRATION: 0 - NONE
ACCESSORY MUSCLE RISE IN CLAVICLE DURING INSPIRATION: 0 - NONE
LOOK OF FEAR: 0 - NONE
LOOK OF FEAR: 0 - NONE
HEART RATE PER MINUTE: 0 - <90 BEATS
PARADOXICAL BREATHING PATTERN: 0 - NONE
PARADOXICAL BREATHING PATTERN: 0 - NONE
RESTLESS NONPURPOSEFUL MOVEMENTS: 0 - NONE
RDOS TOTAL SCORE: 0
RESPIRATORY RATE PER MINUTE: 0 - <19 BREATHS
RDOS TOTAL SCORE: 0
HEART RATE PER MINUTE: 0 - <90 BEATS
INVOLUNTARY NASAL FLARING: 0 - NONE
INVOLUNTARY NASAL FLARING: 0 - NONE
GRUNTING AT END OF EXPIRATION: 0 - NONE
ACCESSORY MUSCLE RISE IN CLAVICLE DURING INSPIRATION: 0 - NONE
ACCESSORY MUSCLE RISE IN CLAVICLE DURING INSPIRATION: 0 - NONE
HEART RATE PER MINUTE: 0 - <90 BEATS
PARADOXICAL BREATHING PATTERN: 0 - NONE

## 2024-07-14 ASSESSMENT — ENCOUNTER SYMPTOMS
DIZZINESS: 0
ACTIVITY CHANGE: 0
SHORTNESS OF BREATH: 1
CHILLS: 0
PHOTOPHOBIA: 0
ALLERGIC/IMMUNOLOGIC NEGATIVE: 1
MYALGIAS: 0
WHEEZING: 0
VOMITING: 0
HEADACHES: 1
SORE THROAT: 0
BACK PAIN: 0
COLOR CHANGE: 0
FATIGUE: 0
HEMATOLOGIC/LYMPHATIC NEGATIVE: 1
FLANK PAIN: 0
DYSURIA: 0
ABDOMINAL PAIN: 0
ENDOCRINE NEGATIVE: 1
NAUSEA: 1
FEVER: 0
LIGHT-HEADEDNESS: 0
COUGH: 0
NUMBNESS: 0
PSYCHIATRIC NEGATIVE: 1
APPETITE CHANGE: 0
WEAKNESS: 0
JOINT SWELLING: 0
FREQUENCY: 0
PALPITATIONS: 0
DIARRHEA: 0

## 2024-07-14 ASSESSMENT — PAIN SCALES - GENERAL
PAINLEVEL_OUTOF10: 3
PAINLEVEL_OUTOF10: 8
PAINLEVEL_OUTOF10: 5 - MODERATE PAIN
PAINLEVEL_OUTOF10: 2
PAINLEVEL_OUTOF10: 2
PAINLEVEL_OUTOF10: 7
PAINLEVEL_OUTOF10: 0 - NO PAIN
PAINLEVEL_OUTOF10: 5 - MODERATE PAIN
PAINLEVEL_OUTOF10: 2
PAINLEVEL_OUTOF10: 0 - NO PAIN
PAINLEVEL_OUTOF10: 4
PAINLEVEL_OUTOF10: 0 - NO PAIN
PAINLEVEL_OUTOF10: 7

## 2024-07-14 ASSESSMENT — COGNITIVE AND FUNCTIONAL STATUS - GENERAL
CLIMB 3 TO 5 STEPS WITH RAILING: A LITTLE
WALKING IN HOSPITAL ROOM: A LITTLE
STANDING UP FROM CHAIR USING ARMS: A LITTLE
TOILETING: A LITTLE
DRESSING REGULAR UPPER BODY CLOTHING: A LITTLE
MOVING TO AND FROM BED TO CHAIR: A LITTLE
DRESSING REGULAR LOWER BODY CLOTHING: A LITTLE
HELP NEEDED FOR BATHING: A LITTLE
MOVING FROM LYING ON BACK TO SITTING ON SIDE OF FLAT BED WITH BEDRAILS: A LITTLE
MOBILITY SCORE: 18
DAILY ACTIVITIY SCORE: 20
TURNING FROM BACK TO SIDE WHILE IN FLAT BAD: A LITTLE

## 2024-07-14 ASSESSMENT — PAIN - FUNCTIONAL ASSESSMENT
PAIN_FUNCTIONAL_ASSESSMENT: 0-10

## 2024-07-14 ASSESSMENT — PAIN DESCRIPTION - LOCATION: LOCATION: INCISION

## 2024-07-14 ASSESSMENT — PAIN DESCRIPTION - DESCRIPTORS: DESCRIPTORS: SORE;TENDER

## 2024-07-14 NOTE — ANESTHESIA PROCEDURE NOTES
Arterial Line:    Date/Time: 7/13/2024 10:59 PM    Staffing  Performed: attending   Authorized by: Nelson Perez MD    Performed by: Nelson Perez MD    An arterial line was placed. Procedure performed using ultrasound guidance.in the OR for the following indication(s): continuous blood pressure monitoring and blood sampling needed.    A 20 gauge (size), 1 and 3/4 inch (length), Angiocath (type) catheter was placed into the Left radial artery, secured by Tegaderm,   Seldinger technique used.  Events:  patient tolerated procedure well with no complications.

## 2024-07-14 NOTE — L&D DELIVERY NOTE
OB Delivery Note  2024  Chapo Martinez  33 y.o.   , Low Transverse     Gestational Age: 36w1d  /Para:   Quantitative Blood Loss: Admission to Discharge: 900 mL (2024  4:48 PM - 2024  4:02 AM)    Kezia Martinez [[id number]]      Labor Events    Rupture date/time: 2024 1057  Rupture type: Artificial  Fluid color: Clear  Fluid odor: None  Labor type:  Without Labor  Labor allowed to proceed with plans for an attempted vaginal birth?: No       Labor Event Times    Decision date/time (emergent ): 2024       Labor Length    3rd stage: 0h 02m       Placenta    Placenta delivery date/time: 2024  Placenta removal: Expressed  Placenta appearance: Intact  Placenta disposition: pathology       Cord    Vessels: 3 vessels  Complications: None  Delayed cord clamping?: Yes  Cord clamped date/time: 2024 22:58:00  Cord blood disposition: Lab  Gases sent?: Yes  Stem cell collection (by provider): No       Anesthesia    Method: General       Operative Delivery    Forceps attempted?: No  Vacuum extractor attempted?: No       Shoulder Dystocia    Shoulder dystocia present?: No        Delivery    Birth date/time: 2024 22:57:00  Delivery type: , Low Transverse   categorization: primary   priority: urgent  Indications for : Persistent Category 2 Tracing Remote from Delivery, Other (Add Comments)  Incision type: low transverse       Resuscitation    Method: Suctioning, Continuous positive airway pressure (CPAP), Positive pressure ventilation (PPV), Tactile stimulation, Supplemental oxygen       Apgars    Living status: Living  Apgar Component Scores:  1 min.:  5 min.:  10 min.:  15 min.:  20 min.:    Skin color:  0  1       Heart rate:  2  2       Reflex irritability:  1  2       Muscle tone:  2  2       Respiratory effort:  2  2       Total:  7  9       Apgars assigned by: MD CLAIRE        Delivery Providers    Delivering clinician: Brayden Castro MD   Provider Role    Chiquis Murrell, RN Delivery Nurse    Jennifer Montana, RN Nursery Nurse    Apoorva Pena MD Resident               Date: 2024 - 2024  OR Location: [facility] 2 OB    Name: Chapo Martinez, : 1990, Age: 33 y.o., MRN: [id number], Sex: female    Diagnosis  * No Diagnosis Codes entered * * No Diagnosis Codes entered *     Procedures    * OBGYN Delivery  Section    Surgeons      * Brayden Castro - Primary    Resident/Fellow/Other Assistant:  Surgeons and Role:     * Apoorva Pena MD - Resident - Assisting    Procedure Summary  Anesthesia: General  ASA: III  Anesthesia Staff: Anesthesiologist: Remy Kamara DO  Anesthesia Resident: Nelson Perez MD  Estimated Blood Loss: 900mL  Intra-op Medications: * Intraprocedure medication information is unavailable because the case start and end events have not been set *           Anesthesia Record               Intraprocedure I/O Totals          Intake    lactated Ringer's bolus 500 mL 2000.00 mL    Oxytocin Drip 0.00 mL    The total shown is the total volume documented since Anesthesia Start was filed.    Phenylephrine Drip 0.00 mL    The total shown is the total volume documented since Anesthesia Start was filed.    Total Intake 2000 mL       Output    Urine 100 mL    Total Output 100 mL       Net    Net Volume 1900 mL          Specimen: No specimens collected     Staff:   Circulator: Chiquis Grayson Person: Gi    Indications: Pt admitted for severe pre-eclampsia with c/f demand ischemia vs ACS. On arrival, tracing noted to be nonreassuring with minimal variability and late decelerations. Urgent CS called. While in OR, labs resulted with c/f HELLP and there were further fetal decelerations, decision made to proceed with stat CS under general anesthesia     Informed Consent:  The risks, benefits, complications, and alternatives were discussed  with the patient. The patient understood that the risks of  section include, but are not limited to: injury to nearby structures or organs, infection, blood loss and possible need for transfusion, and potential need for more surgery including hysterectomy. The patient stated understanding and desired to proceed. All questions were answered. The site of surgery was properly noted and marked. The patient was identified as Chapo Martinez and the procedure verified as a  delivery. A Time Out was held and the above information confirmed.    Findings: Normal appearing gravid uterus, fallopian tubes, and ovaries. 2cm pedunculated fibroid on L posterior aspect of uterus near cornua/fundus. Amniotic fluid clear, infant in cephalic presentation    Procedure Details:  Pt was taken to the OR where she was then placed in the dorsal supine position with a left lateral tilt. A lopez catheter was placed, SCDs were applied, and a vaginal prep was performed. A pre-procedure time out was performed. The pt was given prophylactic dose of IV antibiotics. She was then prepped with betadine splash and draped in the usual sterile fashion. General anesthesia was induced. A Pfannenstiel skin incision was made with the scalpel through the skin and subcutaneous fat to the underlying fascial layer. The fascia was incised in the midline with the scalpel and the incision was extended bilaterally bluntly. The rectus muscle was dissected off bluntly. The muscles were divided in the midline, the peritoneum was then identified and entered bluntly and incision extended superiorly and inferiorly taking care to avoid underlying viscera. The bladder blade was inserted.       A low transverse uterine incision was made with the scalpel, the uterine cavity was entered, and the hysterotomy was extended bilaterally with cephalocaudal stretch. The infant was delivered atraumatically, the cord was clamped and cut and infant was handed off  to awaiting nursing. A cord segment was collected. The placenta was then expressed. The uterus was exteriorized and cleared of all clot and debris. The uterine incision was repaired using a running locked stitch of 0-Vicryl. A dose of hemabate was given for atony. Small bleeders around the hysterotomy were cauterized. Hemostasis was noted.    The uterus was the placed back inside the pelvis, the gutters were cleared of all clots and debris. The hysterotomy was again evaluated and Tariq was placed over the hysterotomy; it was then found to be hemostatic. The underside of the fascia and bladder and the rectus muscles were also inspected, small bleeders were cauterized, and these layers were found to be hemostatic. The fascia was closed using a running stitch of 0-PDS. The subcutaneous layer was irrigated, small bleeders were cauterized, Tariq was placed in both corners of the incision, and the subcutaneous layer was reapproximated using a running stitch of 2-0 Monocryl. A figure of 8 stitch was placed in the subcutaneous tissue on the left corner of the incision to control bleeding; good hemostasis was noted. The skin was closed with 4-0 Monocryl. All counts were correct, the patient tolerated the procedure well. Dr. Castro was present for the entire procedure. The patient was transferred to the CICU for further management.    Complications:  None; patient tolerated the procedure well.     Disposition: ICU - extubated and stable.  Condition: stable    Intrauterine Device Inserted : No, did not desire    Apoorva Pena MD  PGY-4, Obstetrics and Gynecology

## 2024-07-14 NOTE — ANESTHESIA PROCEDURE NOTES
Airway  Date/Time: 7/13/2024 10:57 PM  Urgency: emergent    Airway not difficult    Staffing  Performed: resident   Authorized by: Nelson Perez MD    Performed by: Nelson Perez MD  Patient location during procedure: OR    Consent for Airway (if performed for an anesthetic, see related documentation for consents)  Patient identity confirmed: verbally with patient, arm band and hospital-assigned identification number  Consent: No emergent situation. Verbal consent obtained. Written consent obtained.  Consent given by: patient      Indications and Patient Condition  Indications for airway management: anesthesia and airway protection  Spontaneous ventilation: present  Sedation level: no sedation  Preoxygenated: yes  Patient position: sniffing  MILS maintained throughout  Mask difficulty assessment: 0 - not attempted  No planned trial extubation    Final Airway Details  Final airway type: endotracheal airway      Successful airway: ETT  Cuffed: yes   Successful intubation technique: video laryngoscopy  Facilitating devices/methods: intubating stylet  Endotracheal tube insertion site: oral  Blade type: Large hyperangulated.  ETT size (mm): 6.5  Cormack-Lehane Classification: grade I - full view of glottis  Placement verified by: chest auscultation and capnometry   Measured from: lips  ETT to lips (cm): 23  Number of attempts at approach: 1

## 2024-07-14 NOTE — PROGRESS NOTES
"Pharmacy Medication History Review    Chapo Martinez is a 33 y.o. female admitted for Severe preeclampsia, third trimester ([facility]). Pharmacy reviewed the patient's [id number] medications and allergies for accuracy.    The list below reflects the updated PTA list. Comments regarding how patient may be taking medications differently can be found in the Admit Orders Activity.  Prior to Admission Medications   Prescriptions Last Dose Informant   prenatal vit,calc76/iron/folic (PRENATABS RX ORAL) Past Week Self, Other   Sig: Take by mouth.      Facility-Administered Medications: None        The list below reflects the updated allergy list. Please review each documented allergy for additional clarification and justification.  Allergies  Reviewed by Any Carreon PharmD on 2024   No Known Allergies         Sources used to complete the med history include:  Out patient fill history - none  OARRS: checked 24  Concerns: No - no fill hx  Chart Review  Admission H&P  7/3/24 Routine Prenatal OBGYN visit    Medication Reconciliation  Added: none  Changed: none  Removed: none      Below are additional concerns with the patient's PTA list.  None   Pharmacy considerations: pt is now s/p emergency . Some medication orders may flag for caution in pregnancy. Patient no longer pregnant, but is planning to breastfeed at this time.       Patient accepts M2B at discharge. Pharmacy has been updated to [facility].  Please ensure Expected Discharge date is accurate under the Discharge Plan to ensure timely delivery. Please reach out to M2B team for any same-day discharge changes.  [place]: \"Call Meds to Beds\", [place]: Ext#[phone]      ANY CARREON PharmD  PGY-1 Resident Pharmacist  Please reach out via Secure Chat for questions, or if no response call [phone] or vocera [facility]    "

## 2024-07-14 NOTE — ANESTHESIA POSTPROCEDURE EVALUATION
Patient: Chapo Martinez    Procedure Summary       Date: 07/13/24 Room / Location:     Anesthesia Start: 2246 Anesthesia Stop: 07/14/24 0033    Procedure: Procedure Not Yet Scheduled Diagnosis:     Scheduled Providers:  Responsible Provider: Remy Kamara DO    Anesthesia Type: general ASA Status: 3            Anesthesia Type: No value filed.    Vitals Value Taken Time   /78 07/14/24 0034   Temp 36 07/14/24 0034   Pulse 81 07/14/24 0033   Resp 14 07/14/24 0033   SpO2 96 % 07/14/24 0033   Vitals shown include unfiled device data.    Anesthesia Post Evaluation    Patient location during evaluation: ICU  Patient participation: complete - patient participated  Level of consciousness: awake  Pain management: adequate  Airway patency: patent  Cardiovascular status: acceptable  Respiratory status: acceptable  Hydration status: acceptable  Postoperative Nausea and Vomiting: none        No notable events documented.

## 2024-07-14 NOTE — H&P
"Subjective   History of present illness:  Chapo Martinez is a 33 y.o. female with no significant past medical history admitted to the cardiac ICU with concern for NSTEMI after emergent  section.    Patient initially presented to [facility] on  with chief complaint of chest pain. She describes the pain as a chest pressure with associated dyspnea, nausea, and headache. No diaphoresis. She never had sx like this prior to this event. No fevers, chills, abdominal pain. Upon arrival, [place], patient was blood pressure was 255/137 and was given p.o. nifedipine 10 mg.  Repeat blood pressure was 230/133, patient subsequently treated with hydralazine 10 mg IV.  Patient's blood pressure then normalized to 137/85.  She was given 20 mg IV labetalol, nifedipine XL 30 mg. Patient then transferred to [facility] for emergent .     Upon arrival to [facility], stat bedside echo done which showed normal EF, no wall motion abnormalities. Labs s/f uptrending troponin, worsening LFTs. ECG without evidence of ischemia. Pt taken to OR for  and subsequently transferred to CICU,     Upon arrival to  CICU, pt lying in bed with 10/10 pain at surgical site. Currently CP is much improved, noting only mild CP with deep inspiration. No nausea, vomiting, diaphoresis, HA.     ED Course:  Temp: 36.8 °C (98.2 °F)  Temp Source: Temporal  Heart Rate: 71  Resp: 16  BP: (!) 255/139  MAP (mmHg): 190  BP Method: Arterial line  Patient Position: Lying    Current Vitals:  /79   Pulse 78   Temp 36.6 °C (97.9 °F) (Temporal)   Resp 14   Ht 1.6 m (5' 3\")   Wt 85 kg (187 lb 6.3 oz)   LMP 2023   SpO2 97%   Breastfeeding Yes   BMI 33.19 kg/m²      Labs:   CBC: WBC 12.2 , HGB 12.0, PLT 57  BMP: , K 4.2, Cl 104, HCO3 19, BUN 16, CR 0.64, Glu 129  LFTS:  , , ALKPHOS 129 , TBILI 1.2 , DBILI No results found for requested labs within last 365 days.  TROP: 275  BNP: 21  COAGS: PT 10.2 , PTT " 26  , INR 0.9    Imaging:  Stat bedside ECHO 7/13  - preserved EF, no wall motion abnormalities     Past Medical History:  Past Medical History:   Diagnosis Date    Hypertension         Past Surgical History:   has a past surgical history that includes No past surgeries.     Social history:  Alcohol:   Alcohol Use: Not At Risk (7/13/2024)    AUDIT-C     Frequency of Alcohol Consumption: Never     Average Number of Drinks: Patient does not drink     Frequency of Binge Drinking: Never     Tobacco:   Tobacco Use: Low Risk  (7/13/2024)    Patient History     Smoking Tobacco Use: Never     Smokeless Tobacco Use: Never     Passive Exposure: Never      Illicit Drugs:   Social History     Substance and Sexual Activity   Drug Use Never       She reports that she has never smoked. She has never been exposed to tobacco smoke. She has never used smokeless tobacco. She reports that she does not currently use alcohol. She reports that she does not use drugs.      Family history:  No family history on file.     Allergies:  Patient has no known allergies.    Home medications:  Current Outpatient Medications   Medication Instructions    prenatal vit,calc76/iron/folic (PRENATABS RX ORAL) oral        Review of systems  Review of Systems   Constitutional:  Negative for activity change, appetite change, chills, fatigue and fever.   HENT:  Negative for congestion, ear pain, sneezing and sore throat.    Eyes:  Negative for photophobia and visual disturbance.   Respiratory:  Positive for shortness of breath. Negative for cough and wheezing.    Cardiovascular:  Positive for chest pain. Negative for palpitations.   Gastrointestinal:  Positive for nausea. Negative for abdominal pain, diarrhea and vomiting.   Endocrine: Negative.    Genitourinary:  Negative for dysuria, flank pain, frequency and urgency.   Musculoskeletal:  Negative for back pain, joint swelling and myalgias.   Skin:  Negative for color change and rash.   Allergic/Immunologic:  "Negative.    Neurological:  Positive for headaches. Negative for dizziness, weakness, light-headedness and numbness.   Hematological: Negative.    Psychiatric/Behavioral: Negative.             Objective   Vital signs:  Blood pressure 125/79, pulse 78, temperature 36.6 °C (97.9 °F), temperature source Temporal, resp. rate 14, height 1.6 m (5' 3\"), weight 85 kg (187 lb 6.3 oz), last menstrual period 2023, SpO2 97%, currently breastfeeding.    Physical Exam   Constitutional: Alert and oriented, NAD  HEENT: NC/AT, no scleral icterus, throat non-erythematous and without lesions  Cardiovascular: RRR, no MRG  Respiratory: CTAB, no wheezes, rales, or rhonchi   Gastrointestinal: abdomen soft, bowel sounds normoactive, mild TTP  Skin: warm, well-perfused, no rashes or lesions   Extremities: no lower extremity edema   Neuro: no focal deficits  Psych: appropriate affect       Meds:  labetalol, 400 mg, oral, q12h       PRN medications: calcium gluconate, naloxone     Labs:  Last CBC:  Lab Results   Component Value Date    WBC 12.2 (H) 2024    HGB 12.0 2024    HCT 36.7 2024    MCV 92 2024    PLT 57 (L) 2024       Last RFP:  Lab Results   Component Value Date    GLUCOSE 129 (H) 2024    CALCIUM 7.8 (L) 2024     (L) 2024    K 4.2 2024    CO2 19 (L) 2024     2024    BUN 16 2024    CREATININE 0.64 2024       Last LFTs:  Lab Results   Component Value Date     (H) 2024     (H) 2024    ALKPHOS 129 (H) 2024    BILITOT 1.2 2024       Last coags:  Lab Results   Component Value Date    INR 0.9 2024    APTT 26 (L) 2024       Micro/culture data:  No results found for the last 90 days.         Assessment/Plan   Chapo Martinez is a 33 y.o. female with no significant past medical history admitted to the cardiac ICU with concern for NSTEMI, now s/p emergent  section. Trop elevated 21 -> 266 " -> 297. ECG without evidence of ischemia. Stat bedside echo with adequate EF, no wall motion abnormalities. Patient now s/p emergent  section on  PM. Troponemia likely iso demand ischemia 2/2 severe pre-eclampsia. Troponin now downtrending.     Neuro:  - No active issues    Cardiovascular:  #NSTEMI  :: Trop 21 -> 266 -> 297 -> 275  :: ECG without evidence ischemia   - likely 2/2 demand ischemia iso severe pre-eclampsia   - troponin now downtrending   - formal TTE in AM    Respiratory:  - No active issues    GI:  - No active issues    :  #severe pre-eclampsia   :: initial /137  :: s/p PO IR nifedipine 10mg, IV hydral 10/5mg, IV lebatalol 20/20/40/80mg, and nifedipine XL 30mg   :: s/p    - telemetry   - started on PO labetalol 400mg BID   - magnesium sulfate 2g/hr (plan for 24h post delivery - end  @ 2300)  - OB following     Renal/:  - No active issues    Hematology:  #HELLP syndrome   :: elevated LFTs (AST 1174, ALT 1120, , Tbili 1.6)  :: fibrinogen 625 -> 377; INR 0.8  ::  -> 1688  :: platelets 207 -> 57  :: unable to get platelet count d/t clumping though appears normal number per lab  - trend CBC,  LFTs, fibrinogen, coags, LDH    Endo:  - No active issues    ID:  - No active issues    MSK/Rheum:  - No active issues    Derm:  - No active issues    Psych:  - No active issues    F: Replenish PRN  E: Replenish PRN  N: regular   DVT Ppx: heparin ppx to start 8h post delivery ( @ 0700 pending coags)  GI Ppx: not indicated   Access/Lines: PIV / [place] ()  Abx: None   O2: None     Pain regimen: Dilaudid PCA  GI Laxative: None     Code status: Full Code  Medical Decision Maker: Carlos (spouse) [phone]       Patient to be staffed in AM with attending.     Tu Amaya MD  Internal Medicine, PGY-3  [facility]

## 2024-07-14 NOTE — PROGRESS NOTES
Postpartum Progress Note    Assessment/Plan   Chapo Martinez is a 33 y.o.,  now POD1 from pLTCS at 36.0wga in the s/o HELLP syndrome     HELLP   - diagnosed by ALT/AST >2x ULN (peak 1120/1174), thrombocytopenia (plts 57), elevated LDH (peak 1600)   - labs improving postpartum:    - ALT/AST: peak 1120/1174 >> most recent 583/501   - Plts: 57 --> 60 --> 58    - LDH : peak 1688 >> 664 most recent    - Hgb : 11 --> 10.2   - Cr wnl, stable 0.65    - lactate 2.5, repeat pending   - also with severe range Bps requiring IR tx, last  @2230: nifed 10, hydral 10, lab 20/20/40/80, hydral 5.   - PO regimen: labetalol 400 BID, currently normotensive.   - asymptomatic   - Cont Mg 2g/hr, therapeutic 5.11 this AM   - 1800 labs pending currently, if continuing to improve or if remains stable, plan for AM labs.     Troponemia   - peak 297 > 275. Repeat pending   - EKG without ST changes   - Cards consulted, appreciate recs.   - Bedside echo on admission with preserved EF, no wall motion abnormalities. For formal TTE in AM   - suspect likely demand ischemia, cannot rule out NSTEMI. No indication for acute intervention at this time  - s/p postpartum tele, now transferred out of CICU    Postop/postpartum care  - pLTCS, . Pressure dressing in place.   - defer tylenol and and toradol in setting of HELLP, currently using dilaudid PCA with adequate pain control   - breastfeeding, pump at bedside. Lactation consult PRN   - PPBC: declines  - DVT ppx: hep ppx given coags wnl     Seen and d/w Dr. Brian Ridley MD  PGY3, Obstetrics and Gynecology     Principal Problem:    Severe preeclampsia, third trimester (HHS-HCC)  Active Problems:    Preeclampsia (HHS-HCC)    Pregnancy Problems (from 24 to present)       Problem Noted Resolved    Preeclampsia (HHS-HCC) 2024 by Britt García MD No    Priority:  Medium      Severe preeclampsia, third trimester (HHS-HCC) 2024 by Apoorva Pena MD No    Priority:   Medium             section delivery  Patient is currently breastfeedingThe patient's blood type is A POS. The baby's blood type is AB POS. Rhogam is not indicated.    Subjective   Pt with moderate postop pain, controlled well on PCA. Tolerating PO though with some throat soreness with swallowing. Denies n/v. Denies HA, VA changes, CP, SOB, RUQ pain    Objective   Allergies:   Patient has no known allergies.         Last Vitals:  Temp Pulse Resp BP MAP Pulse Ox   36.5 °C (97.7 °F) 79 16 107/62   96 %     Vitals Min/Max Last 24 Hours:  Temp  Min: 35.1 °C (95.2 °F)  Max: 36.8 °C (98.2 °F)  Pulse  Min: 78  Max: 100  Resp  Min: 13  Max: 20  BP  Min: 107/62  Max: 177/107  MAP  Min: 69 mmHg  Max: 188 mmHg    Intake/Output:     Intake/Output Summary (Last 24 hours) at 2024 1854  Last data filed at 2024 1830  Gross per 24 hour   Intake 5721.33 ml   Output 2745 ml   Net 2976.33 ml       Physical Exam:  General: no acute distress  HEENT: normocephalic, atraumatic  Heart: warm and well perfused, RRR  Lungs: no increased WOB on RA, CTAB  Abdomen: soft throughout, incision covered in pressure dressing, clean without any shadowing.   Extremities: moving all extremities  Neuro: awake and conversant, DTRs 2+   Psych: appropriate mood and affect     Lab Data:  Lab Results   Component Value Date    WBC 14.0 (H) 2024    HGB 10.2 (L) 2024    HCT 30.1 (L) 2024    PLT 58 (L) 2024     Lab Results   Component Value Date    GLUCOSE 108 (H) 2024     (L) 2024    K 3.9 2024     2024    CO2 20 (L) 2024    ANIONGAP 13 2024    BUN 15 2024    CREATININE 0.64 2024    EGFR >90 2024    CALCIUM 6.8 (L) 2024    ALBUMIN 2.5 (L) 2024    PROT 4.9 (L) 2024    ALKPHOS 112 (H) 2024     (H) 2024     (H) 2024    BILITOT 0.8 2024     Lab Results   Component Value Date    APTT 27 2024    PROTIME  9.2 (L) 07/14/2024    INR 0.8 (L) 07/14/2024     Lab Results   Component Value Date    FIBRINOGEN 371 07/14/2024     Lab Results   Component Value Date    LACTATE 2.5 (H) 07/14/2024

## 2024-07-14 NOTE — CARE PLAN
The patient's goals for the shift include remain stable for transfer    The clinical goals for the shift include Stable BP and moving closer to baby    Over the shift, the patient did not make progress toward the following goals. Barriers to progression include . Recommendations to address these barriers include .      Problem: Hypertensive Disorder of Pregnancy (HDP)  Goal: Minimal s/sx of HDP and BP<160/110  7/14/2024 1215 by Ann Banda RN  Outcome: Progressing  7/14/2024 1215 by Ann Banda RN  Outcome: Progressing     Problem: Pain  Goal: Takes deep breaths with improved pain control throughout the shift  7/14/2024 1215 by Ann Banda RN  Outcome: Progressing  7/14/2024 1215 by Ann Banda RN  Outcome: Progressing  Goal: Turns in bed with improved pain control throughout the shift  7/14/2024 1215 by Ann Banda RN  Outcome: Progressing  7/14/2024 1215 by Ann Banda RN  Outcome: Progressing  Goal: Walks with improved pain control throughout the shift  7/14/2024 1215 by Ann Banda RN  Outcome: Progressing  7/14/2024 1215 by Ann Banda RN  Outcome: Progressing  Goal: Performs ADL's with improved pain control throughout shift  7/14/2024 1215 by Ann Banda RN  Outcome: Progressing  7/14/2024 1215 by Ann Banda RN  Outcome: Progressing  Goal: Participates in PT with improved pain control throughout the shift  7/14/2024 1215 by Ann Banda RN  Outcome: Progressing  7/14/2024 1215 by Ann Banda RN  Outcome: Progressing  Goal: Free from opioid side effects throughout the shift  7/14/2024 1215 by Ann Banda RN  Outcome: Progressing  7/14/2024 1215 by Ann Banda RN  Outcome: Progressing  Goal: Free from acute confusion related to pain meds throughout the shift  7/14/2024 1215 by Ann Banda RN  Outcome: Progressing  7/14/2024 1215 by Ann Banda RN  Outcome: Progressing

## 2024-07-14 NOTE — H&P
Obstetrical Admission History and Physical     Chapo Martinez is a 33 y.o.  at 36w0d. KOKO: 8/10/2024, by Last Menstrual Period.  She has had prenatal care with Dr. Sawyer .    Chief Complaint: Chest Pain    Assessment/Plan    sPEC  - Diagnosed based on severe range BP requiring IR txt  - IR txt: nifed 10, hydral 10, lab 20/20/40/80, hydral 5  - PO antihypertensive: lab 200 BID -> 400 BID  - HELLP labs n/f LFTs in 200s; Hgb 16 and plts wnl but likely hemoconcentrated; . Not meeting criteria for HELLP but high risk. Repeat labs pending on admission  - On Mg at 2g/hr    C/f ACS  - Trop 21 -> 266 at OSH, repeat on arrival pending  - EKG at OSH without ST changes, repeat on arrival pending  - Cards consulted, bedside echo with preserved EF, no wall motion abn; suspect demand ischemia but cannot rule out NSTEMI  - Keep BP under 120 systolic per cards; will txt with IR meds and uptitrate PO meds as needed  - For tele after urgent delivery, anticipate transfer to CICU  - At this time do not anticipate need for cath or anticoagulation per cards    Fetal status  - FHT with late decels and not reassuring with minimal variability  - For urgent delivery given tracing and overall clinical status  - Up to date on PNC, GBS pending from [facility]    Delivery planning  - For primary  delivery in setting of fetal status and G1 remote from delivery. Risks and benefits of  delivery discussed, including injury to surrounding structures, infection, and excessive blood loss. Consents signed  - T&S/CBC on admission; T&C 2UPRBC for risk factors  - Plan for single dose spinal in case of need for anticoagulation later    Maternal well-being  - PPBC: undecided, declines IUD    Seen and d/w Dr. Do Pena MD  PGY-4, Obstetrics and Gynecology    Principal Problem:    Severe preeclampsia, third trimester ([facility]-HCC)  Active Problems:    Preeclampsia ([facility]-HCC)      Pregnancy Problems (from  24 to present)       Problem Noted Resolved    Preeclampsia ([facility]) 2024 by Britt García MD No    Priority:  Medium      Severe preeclampsia, third trimester ([facility]) 2024 by Apoorva Pena MD No    Priority:  Medium              Subjective   Presents as a transfer from [place] with sPEC with c/f ACS. Please see notes by Dr. Lenz and assessment above for further details    On arrival, reports mild HA, no vision changes. SOB improved. CP improved - previously was crushing chest pain 10/10 starting approx 4pm, improved after getting meds and now 2-3/10. Some RUQ pain kristian with inspiration.       Obstetrical History   OB History    Para Term  AB Living   1             SAB IAB Ectopic Multiple Live Births                  # Outcome Date GA Lbr Javi/2nd Weight Sex Type Anes PTL Lv   1 Current                Past Medical History  History reviewed. No pertinent past medical history.     Past Surgical History   Past Surgical History:   Procedure Laterality Date    NO PAST SURGERIES         Social History  Social History     Tobacco Use    Smoking status: Never     Passive exposure: Never    Smokeless tobacco: Never   Substance Use Topics    Alcohol use: Not Currently     Substance and Sexual Activity   Drug Use Never       Allergies  Patient has no known allergies.     Medications  Medications Prior to Admission   Medication Sig Dispense Refill Last Dose    prenatal vit,calc76/iron/folic (PRENATABS RX ORAL) Take by mouth.          Objective    Last Vitals  Temp Pulse Resp BP MAP O2 Sat   36.8 °C (98.2 °F) 90 18 (!) 146/88   97 %     Physical Examination  Physical Exam  Constitutional:       Appearance: Normal appearance.   HENT:      Head: Normocephalic and atraumatic.      Nose: Nose normal.      Mouth/Throat:      Mouth: Mucous membranes are moist.   Eyes:      Extraocular Movements: Extraocular movements intact.   Pulmonary:      Effort: Pulmonary effort is normal.    Abdominal:      General: Abdomen is flat.   Musculoskeletal:         General: Normal range of motion.      Cervical back: Normal range of motion.   Skin:     General: Skin is warm and dry.   Neurological:      General: No focal deficit present.      Mental Status: She is alert and oriented to person, place, and time.   Psychiatric:         Mood and Affect: Mood normal.         Behavior: Behavior normal.       Fetal Assessment  Movement: Present  Mode: External US  Baseline Fetal Heart Rate (bpm): 135 bpm  Baseline Classification: Normal  Variability: Minimal  Pattern: Late decelerations  FHR Category: Category II  Multiple Births: No     Contraction Frequency: irregular      Lab Review  Labs in chart were reviewed.

## 2024-07-14 NOTE — PROGRESS NOTES
Postpartum Progress Note    Assessment/Plan   Chapo Martinez is a 33 y.o., , who delivered at 36w0d gestation and is now postop day 1 from urgent CS under general anesthesia for non-reassuring FHT in the setting of HELLP syndrome.     HELLP syndrome  - Diagnosed with severe pre-eclampsia based on severe range BP requiring IV txt at [facility]. HELLP syndrome dx on arrival at time of CS based on lab abnormalities as below, now improving              - ALT/AST: peaked at 1120/1174 > 839/997              - Plts: 207 > clumped but normal appearing > 57 > 60               - LDH: 580 > 1688 >> 940              - Hgb and Cr remain wnl  - S/p IR BP txt: nifed 10, hydral 10, lab 20/20/40/80, hydral 5 (last )  - PO antihypertensives: continue 400 BID. Okay to continue to uptitrate per cardiology to meet BP goals   - On Mg at 2g/hr, continue for 24h postpartum (until  @ 2300). Therapeutic Mg level is 4.8-8.4mg/dL  - Repeat labs q6h at minimum, but if trending trops can redraw with these      C/f ACS  - Trop 21 > 266 at OSH > 297 > 275  - EKG at OSH without ST changes  - Cards consulted on admission, bedside echo with preserved EF, no wall motion abn; suspect demand ischemia   - At this time do not anticipate need for cath or anticoagulation per cards  - Further management per CICU    Postop/postpartum care  - pLTCS uncomplicated, . Pressure dressing in place, to be removed POD#1-2  - Avoid tylenol and toradol in setting of HELLP; dilaudid PCA currently controlling pain well  - Considering breastfeeding, can pump while in ICU  - PPBC: declines at this time  - DVT ppx: heparin ppx to start 8hrs postop if coags remain wnl    Please Epic chat or call [facility] [phone] with any concerns  Patient discussed with M attending Dr. Kathy Young MD  PGY-3, Obstetrics and Gynecology      Principal Problem:    Severe preeclampsia, third trimester ([facility])  Active Problems:    Preeclampsia  ([facility])    Pregnancy Problems (from 02/07/24 to present)       Problem Noted Resolved    Preeclampsia ([facility]) 7/13/2024 by Britt García MD No    Severe preeclampsia, third trimester ([facility]) 7/13/2024 by Apoorva Pena MD No            Subjective   Patient feeling well. Pain is well controlled. No complaints this AM. Denies HA, vision changes, CP, SOB, or RUQ pain. Considering breastfeeding, declines birth control.     Objective   Allergies:   Patient has no known allergies.         Last Vitals:  Temp Pulse Resp BP MAP Pulse Ox   36.5 °C (97.7 °F) 79 16 (!) 144/65 (from arterial line)   96 %     Vitals Min/Max Last 24 Hours:  Temp  Min: 35.1 °C (95.2 °F)  Max: 36.8 °C (98.2 °F)  Pulse  Min: 78  Max: 100  Resp  Min: 13  Max: 20  BP  Min: 116/74  Max: 180/109  MAP  Min: 69 mmHg  Max: 188 mmHg    Intake/Output:     Intake/Output Summary (Last 24 hours) at 7/14/2024 1822  Last data filed at 7/14/2024 1733  Gross per 24 hour   Intake 5613.33 ml   Output 2445 ml   Net 3168.33 ml       Physical Exam:  General: Well appearing, alert  HEENT: normocephalic, EOMI, clear sclera  Cardio: Warm and well perfused, RRR  Resp: breathing comfortably on room air, CTAB  Abd: soft, appropriately tender, fundus firm and below  the umbilicus, incision covered in pressure dressing with minimal strike through  Neuro: grossly intact, no focal deficits, DTRs 2+  Extremities: full ROM, no calf tenderness  Psych: A&O x3, appropriate mood and affect    Lab Data:  Results from last 7 days   Lab Units 07/14/24  1151 07/14/24  0539 07/13/24  2355   WBC AUTO x10*3/uL 14.0* 13.0* 12.2*   HEMOGLOBIN g/dL 10.2* 11.0* 12.0   HEMATOCRIT % 30.1* 31.8* 36.7   PLATELETS AUTO x10*3/uL 58* 60* 57*   AST U/L 501* 797* 997*   ALT U/L 583* 712* 839*   CREATININE mg/dL 0.64 0.65 0.64

## 2024-07-14 NOTE — SIGNIFICANT EVENT
Trop 21 -> 266. Still with intermittent chest pressure. Repeat EKG NSR without ST or T wave changes. Reviewed case with on call cardiologist Dr. Brennan who reviewed case including both EKGs and recommends initiating PO labetalol 200mg BID and transferring to [facility] as soon as possible for delivery. Will need stat cardiology consult and echo on arrival to [facility]. Did discuss with patient likely recommendation for pCS on arrival to [facility]. To remain NPO. Most recent /95, HR 95, SpO2 99% on RA. FHR currently category 1. Transport team at bedside now for transport. Dr. Lei at [facility] updated.     Rebecca Lenz MD

## 2024-07-14 NOTE — CARE PLAN
The patient's goals for the shift include remain stable for transfer    The clinical goals for the shift include BP under 160/110    Problem: Antepartum  Goal: Maintain pregnancy as long as maternal and/or fetal condition is stable  Outcome: Met  Goal: FHR remains reassuring  Outcome: Met

## 2024-07-14 NOTE — PROGRESS NOTES
"  [facility]    Chapo Martinez is a 33 y.o. female on hospital day 1    Subjective   No acute events overnight.  Upon evaluation this AM, feeling better. Denies CP, SOB. Has pain at incision site.          Objective     Last Recorded Vitals  Blood pressure 125/79, pulse 80, temperature 35.1 °C (95.2 °F), temperature source Temporal, resp. rate 16, height 1.6 m (5' 3\"), weight 86.5 kg (190 lb 11.2 oz), last menstrual period 11/04/2023, SpO2 97%, currently breastfeeding.    Intake/Output last 3 Shifts:  I/O last 3 completed shifts:  In: 3322.9 (38.4 mL/kg) [P.O.:240; I.V.:1082.9 (12.5 mL/kg); IV Piggyback:2000]  Out: 1520 (17.6 mL/kg) [Urine:620 (0.2 mL/kg/hr); Blood:900]  Weight: 86.5 kg     Relevant Results  Results from last 7 days   Lab Units 07/14/24 0539 07/13/24 2355 07/13/24 2157 07/13/24  1718   WBC AUTO x10*3/uL 13.0* 12.2* 17.2* 10.2   HEMOGLOBIN g/dL 11.0* 12.0 14.4 16.9*   HEMATOCRIT % 31.8* 36.7 42.1 50.4*   PLATELETS AUTO x10*3/uL 60* 57*  --  207     Results from last 7 days   Lab Units 07/14/24 0539 07/13/24 2355 07/13/24 2157   SODIUM mmol/L 133* 133* 133*   POTASSIUM mmol/L 4.4 4.2 3.9   CO2 mmol/L 20* 19* 20*   ANION GAP mmol/L 13 14 16   BUN mg/dL 16 16 16   CREATININE mg/dL 0.65 0.64 0.74   GLUCOSE mg/dL 110* 129* 118*   EGFR mL/min/1.73m*2 >90 >90 >90   MAGNESIUM mg/dL 5.11*  --   --    PHOSPHORUS mg/dL 5.0*  --   --       Results from last 7 days   Lab Units 07/14/24  0539 07/13/24  2355 07/13/24  2157   ALT U/L 712* 839* 1,120*   AST U/L 797* 997* 1,174*   ALK PHOS U/L 115* 129* 165*      Results from last 7 days   Lab Units 07/14/24  0539 07/13/24  2355 07/13/24  2157   INR  0.9 0.9 0.8*     Results from last 7 days   Lab Units 07/13/24  2314 07/13/24  2304   POCT PH, ARTERIAL pH 7.27*  --    POCT PO2, ARTERIAL mm Hg 119*  --    POCT PCO2, ARTERIAL mm Hg 41  --    FIO2 % 60 21  21               No lab exists for component: \"BNPRESU\", \"CPKT\"      trophs:10     Physical " Exam  Constitutional: Alert and oriented, NAD  HEENT: NC/AT, no scleral icterus  Cardiovascular: RRR, no MRG  Respiratory: CTAB, no wheezes, rales, or rhonchi   Gastrointestinal: abdomen soft, bowel sounds normoactive. Incision site covered, dry.  Skin: warm, well-perfused, no rashes or lesions   Extremities: no lower extremity edema   Neuro: no focal deficits  Psych: appropriate affect        Medications    labetalol, 400 mg, oral, q12h        HYDROmorphone,   lactated Ringer's, 75 mL/hr, Last Rate: 75 mL/hr (24)  magnesium sulfate, 2 g/hr, Last Rate: 2 g/hr (24)        PRN medications: calcium gluconate, naloxone           Assessment/Plan   Chapo Martinez is a 33 y.o. female with no significant past medical history admitted to the cardiac ICU with concern for NSTEMI, now s/p emergent  section. Trop elevated 21 -> 266 -> 297. ECG without evidence of ischemia. Stat bedside echo with adequate EF, no wall motion abnormalities. Patient now s/p emergent  section on  PM. Troponemia likely iso demand ischemia 2/2 severe pre-eclampsia. Troponin now downtrending.      Neuro:  - No active issues     Cardiovascular:  #NSTEMI  :: Trop 21 -> 266 -> 297 -> 275  :: ECG without evidence ischemia   :: limited echo normal  - likely 2/2 demand ischemia iso severe pre-eclampsia   - troponin now downtrending   - formal TTE in AM  - lactate elevated, we are repeating and if improved may go to OB service     Respiratory:  - No active issues     GI:  - No active issues     :  #severe pre-eclampsia   :: initial /137  :: s/p PO IR nifedipine 10mg, IV hydral 10/5mg, IV lebatalol 20/20/40/80mg, and nifedipine XL 30mg   :: s/p    - telemetry   - started on PO labetalol 400mg BID   - magnesium sulfate 2g/hr (plan for 24h post delivery - end  @ 2300)  - OB following      Renal/:  - No active issues     Hematology:  #HELLP syndrome   :: elevated LFTs (AST 1174, ALT 1120,  , Tbili 1.6)  :: fibrinogen 625 -> 377; INR 0.8  ::  -> 1688  :: platelets 207 -> 57  :: unable to get platelet count d/t clumping though appears normal number per lab  - trend CBC,  LFTs, fibrinogen, coags, LDH     Endo:  - No active issues     ID:  - No active issues     MSK/Rheum:  - No active issues     Derm:  - No active issues     Psych:  - No active issues     F: Replenish PRN  E: Replenish PRN  N: regular   DVT Ppx: heparin ppx   GI Ppx: not indicated   Access/Lines: PIV / Powell (7/13)  Abx: None   O2: None      Pain regimen: Dilaudid PCA  GI Laxative: None      Code status: Full Code  Medical Decision Maker: Carlos (spouse) [phone]       Reanna Brice MD  Internal Medicine, PGY-1

## 2024-07-14 NOTE — SIGNIFICANT EVENT
Patient transferred to CICU after CS for tele and further cardiac care    HELLP syndrome  - Diagnosed with severe pre-eclampsia based on severe range BP requiring IR txt and now HELLP syndrome based on lab abnormalities as below:   - ALT/AST: 278/289 > 1120/1174 > pending   - Plts: 207 > clumped but normal appearing > 57   - LDH: 580 > 1688 >1186   - Hgb and Cr remain wnl  - S/p IR BP txt: nifed 10, hydral 10, lab 20/20/40/80, hydral 5  - PO antihypertensives: lab 200 BID -> 400 BID. Okay to continue to uptitrate per cardiology to meet BP goals   - On Mg at 2g/hr, continue for 24h postpartum (until 7/14 @ 2300). Therapeutic Mg level is 4.8-8.4mg/dL  - Repeat labs q6h at minimum, but if trending trops can redraw with these      C/f ACS  - Trop 21 > 266 at OSH > 297 > 275  - EKG at OSH without ST changes  - Cards consulted on admission, bedside echo with preserved EF, no wall motion abn; suspect demand ischemia but cannot rule out NSTEMI  - At this time do not anticipate need for cath or anticoagulation per cards  - Further management per CICU    Postop/postpartum care  - pLTCS uncomplicated, . Pressure dressing in place, to be removed POD#1-2  - Avoid tylenol and toradol in setting of HELLP; dilaudid PCA for pain control ordered  - Breastfeeding plan to be discussed with patient, can pump while in ICU if desired  - PPBC: to be discussed further, declined post-placental IUD  - DVT ppx: heparin ppx to start 8hrs postop if coags remain wnl    Appreciate excellent care by CICU team    D/w Dr. Castro and Dr. Kathy Pena MD  PGY-4, Obstetrics and Gynecology

## 2024-07-15 ENCOUNTER — APPOINTMENT (OUTPATIENT)
Dept: CARDIOLOGY | Facility: HOSPITAL | Age: 34
End: 2024-07-15
Payer: COMMERCIAL

## 2024-07-15 LAB
ALBUMIN SERPL BCP-MCNC: 2.7 G/DL (ref 3.4–5)
ALP SERPL-CCNC: 108 U/L (ref 33–110)
ALT SERPL W P-5'-P-CCNC: 354 U/L (ref 7–45)
ANION GAP SERPL CALC-SCNC: 13 MMOL/L (ref 10–20)
AORTIC VALVE PEAK VELOCITY: 1.75 M/S
APTT PPP: 31 SECONDS (ref 27–38)
AST SERPL W P-5'-P-CCNC: 177 U/L (ref 9–39)
AV PEAK GRADIENT: 12.2 MMHG
BILIRUB SERPL-MCNC: 0.4 MG/DL (ref 0–1.2)
BUN SERPL-MCNC: 9 MG/DL (ref 6–23)
CALCIUM SERPL-MCNC: 7 MG/DL (ref 8.6–10.6)
CHLORIDE SERPL-SCNC: 105 MMOL/L (ref 98–107)
CO2 SERPL-SCNC: 21 MMOL/L (ref 21–32)
CREAT SERPL-MCNC: 0.61 MG/DL (ref 0.5–1.05)
EGFRCR SERPLBLD CKD-EPI 2021: >90 ML/MIN/1.73M*2
EJECTION FRACTION APICAL 4 CHAMBER: 31.4
EJECTION FRACTION: 63 %
EJECTION FRACTION: 73 %
ERYTHROCYTE [DISTWIDTH] IN BLOOD BY AUTOMATED COUNT: 13.5 % (ref 11.5–14.5)
FIBRINOGEN PPP-MCNC: 468 MG/DL (ref 200–400)
GLUCOSE SERPL-MCNC: 91 MG/DL (ref 74–99)
HAPTOGLOB SERPL-MCNC: <10 MG/DL (ref 37–246)
HCT VFR BLD AUTO: 25.9 % (ref 36–46)
HGB BLD-MCNC: 8.6 G/DL (ref 12–16)
INR PPP: 0.8 (ref 0.9–1.1)
LEFT ATRIUM VOLUME AREA LENGTH INDEX BSA: 21.9 ML/M2
LEFT VENTRICLE INTERNAL DIMENSION DIASTOLE: 4.5 CM (ref 3.5–6)
LEFT VENTRICULAR OUTFLOW TRACT DIAMETER: 1.92 CM
MCH RBC QN AUTO: 30.1 PG (ref 26–34)
MCHC RBC AUTO-ENTMCNC: 33.2 G/DL (ref 32–36)
MCV RBC AUTO: 91 FL (ref 80–100)
MITRAL VALVE E/A RATIO: 1.73
NRBC BLD-RTO: 0.1 /100 WBCS (ref 0–0)
PLATELET # BLD AUTO: 68 X10*3/UL (ref 150–450)
POTASSIUM SERPL-SCNC: 4.2 MMOL/L (ref 3.5–5.3)
PROT SERPL-MCNC: 5.2 G/DL (ref 6.4–8.2)
PROTHROMBIN TIME: 9.2 SECONDS (ref 9.8–12.8)
RBC # BLD AUTO: 2.86 X10*6/UL (ref 4–5.2)
RIGHT VENTRICLE FREE WALL PEAK S': 19 CM/S
SODIUM SERPL-SCNC: 135 MMOL/L (ref 136–145)
TRICUSPID ANNULAR PLANE SYSTOLIC EXCURSION: 2.8 CM
WBC # BLD AUTO: 15.2 X10*3/UL (ref 4.4–11.3)

## 2024-07-15 PROCEDURE — 85384 FIBRINOGEN ACTIVITY: CPT

## 2024-07-15 PROCEDURE — 85610 PROTHROMBIN TIME: CPT

## 2024-07-15 PROCEDURE — 2500000001 HC RX 250 WO HCPCS SELF ADMINISTERED DRUGS (ALT 637 FOR MEDICARE OP)

## 2024-07-15 PROCEDURE — 2500000004 HC RX 250 GENERAL PHARMACY W/ HCPCS (ALT 636 FOR OP/ED)

## 2024-07-15 PROCEDURE — 2500000001 HC RX 250 WO HCPCS SELF ADMINISTERED DRUGS (ALT 637 FOR MEDICARE OP): Performed by: STUDENT IN AN ORGANIZED HEALTH CARE EDUCATION/TRAINING PROGRAM

## 2024-07-15 PROCEDURE — 36415 COLL VENOUS BLD VENIPUNCTURE: CPT

## 2024-07-15 PROCEDURE — 1210000001 HC SEMI-PRIVATE ROOM DAILY

## 2024-07-15 PROCEDURE — 80053 COMPREHEN METABOLIC PANEL: CPT

## 2024-07-15 PROCEDURE — 93306 TTE W/DOPPLER COMPLETE: CPT | Performed by: INTERNAL MEDICINE

## 2024-07-15 PROCEDURE — 85027 COMPLETE CBC AUTOMATED: CPT

## 2024-07-15 PROCEDURE — 2500000004 HC RX 250 GENERAL PHARMACY W/ HCPCS (ALT 636 FOR OP/ED): Performed by: STUDENT IN AN ORGANIZED HEALTH CARE EDUCATION/TRAINING PROGRAM

## 2024-07-15 PROCEDURE — 93306 TTE W/DOPPLER COMPLETE: CPT

## 2024-07-15 RX ORDER — OXYCODONE HYDROCHLORIDE 5 MG/1
5 TABLET ORAL EVERY 6 HOURS PRN
Status: DISCONTINUED | OUTPATIENT
Start: 2024-07-15 | End: 2024-07-17 | Stop reason: HOSPADM

## 2024-07-15 RX ORDER — OXYCODONE HYDROCHLORIDE 5 MG/1
10 TABLET ORAL EVERY 6 HOURS PRN
Status: DISCONTINUED | OUTPATIENT
Start: 2024-07-15 | End: 2024-07-17 | Stop reason: HOSPADM

## 2024-07-15 RX ADMIN — OXYCODONE HYDROCHLORIDE 5 MG: 5 TABLET ORAL at 19:27

## 2024-07-15 RX ADMIN — SODIUM CHLORIDE, POTASSIUM CHLORIDE, SODIUM LACTATE AND CALCIUM CHLORIDE 75 ML/HR: 600; 310; 30; 20 INJECTION, SOLUTION INTRAVENOUS at 05:37

## 2024-07-15 RX ADMIN — OXYCODONE HYDROCHLORIDE 5 MG: 5 TABLET ORAL at 13:17

## 2024-07-15 RX ADMIN — LABETALOL HYDROCHLORIDE 400 MG: 100 TABLET, FILM COATED ORAL at 08:17

## 2024-07-15 RX ADMIN — HEPARIN SODIUM 5000 UNITS: 5000 INJECTION INTRAVENOUS; SUBCUTANEOUS at 22:50

## 2024-07-15 RX ADMIN — PERFLUTREN 2 ML OF DILUTION: 6.52 INJECTION, SUSPENSION INTRAVENOUS at 12:32

## 2024-07-15 RX ADMIN — LABETALOL HYDROCHLORIDE 400 MG: 100 TABLET, FILM COATED ORAL at 20:20

## 2024-07-15 RX ADMIN — HEPARIN SODIUM 5000 UNITS: 5000 INJECTION INTRAVENOUS; SUBCUTANEOUS at 06:23

## 2024-07-15 RX ADMIN — HEPARIN SODIUM 5000 UNITS: 5000 INJECTION INTRAVENOUS; SUBCUTANEOUS at 14:15

## 2024-07-15 ASSESSMENT — PAIN DESCRIPTION - DESCRIPTORS
DESCRIPTORS: CRAMPING;SORE
DESCRIPTORS: CRAMPING;SORE
DESCRIPTORS: SORE

## 2024-07-15 ASSESSMENT — PAIN SCALES - GENERAL
PAINLEVEL_OUTOF10: 5 - MODERATE PAIN
PAINLEVEL_OUTOF10: 1
PAINLEVEL_OUTOF10: 0 - NO PAIN
PAINLEVEL_OUTOF10: 2
PAINLEVEL_OUTOF10: 2

## 2024-07-15 ASSESSMENT — PAIN DESCRIPTION - LOCATION: LOCATION: ABDOMEN

## 2024-07-15 NOTE — CARE PLAN
The patient's goals for the shift include meet with lactation    The clinical goals for the shift include BP <160/110 this shift    Over the shift, the patient did not make progress with her pain control with PO meds. Patient ambulating with less pain then this AM. Plans to visit NICU when  comes. Seen by lactation. BP cuff for home.    Problem: Hypertensive Disorder of Pregnancy (HDP)  Goal: Minimal s/sx of HDP and BP<160/110  Outcome: Progressing     Problem: Pain  Goal: Takes deep breaths with improved pain control throughout the shift  Outcome: Progressing  Goal: Turns in bed with improved pain control throughout the shift  Outcome: Progressing  Goal: Walks with improved pain control throughout the shift  Outcome: Progressing  Goal: Performs ADL's with improved pain control throughout shift  Outcome: Progressing  Goal: Participates in PT with improved pain control throughout the shift  Outcome: Progressing  Goal: Free from opioid side effects throughout the shift  Outcome: Progressing  Goal: Free from acute confusion related to pain meds throughout the shift  Outcome: Progressing     Problem: Postpartum  Goal: Minimal s/sx of HDP and BP<160/110  Outcome: Progressing     Problem: Fall/Injury  Goal: Not fall by end of shift  Outcome: Progressing  Goal: Verbalize understanding of personal risk factors for fall in the hospital  Outcome: Progressing

## 2024-07-15 NOTE — PROGRESS NOTES
"POSTPARTUM MFM PROGRESS NOTE   7/15/2024, 11:20 AM     SUBJECTIVE: No acute events overnight. Patient has no complaints this morning. Denies HA, vision changes, CP, SOB, RUQ or epigastric pain. Has some abdominal pain that is worse with ambulation, but has been getting up to the bathroom. Light lochia. No N/V, tolerating regular diet.    OBJECTIVE:   /83   Pulse 96   Temp 37 °C (98.6 °F) (Temporal)   Resp 18   Ht 1.6 m (5' 3\")   Wt 86.5 kg (190 lb 11.2 oz)   LMP 2023   SpO2 96%   Breastfeeding Yes   BMI 33.78 kg/m²    Temp  Min: 36 °C (96.8 °F)  Max: 37.1 °C (98.8 °F)  Pulse  Min: 73  Max: 97  BP  Min: 106/69  Max: 144/65    General:  AAOx3, No acute distress  Cardiovascular: Warm and well perfused  Respiratory: Normal respiratory effort   Abdominal:  Soft, non-tender, no rebound or guarding  Extremities: Warm, well perfused, no edema, no calf tenderness     Labs:   Lab Results   Component Value Date    WBC 15.2 (H) 07/15/2024    HGB 8.6 (L) 07/15/2024    HCT 25.9 (L) 07/15/2024    PLT 68 (L) 07/15/2024     Lab Results   Component Value Date    GLUCOSE 91 07/15/2024     (L) 07/15/2024    K 4.2 07/15/2024     07/15/2024    CO2 21 07/15/2024    ANIONGAP 13 07/15/2024    BUN 9 07/15/2024    CREATININE 0.61 07/15/2024    EGFR >90 07/15/2024    CALCIUM 7.0 (L) 07/15/2024    ALBUMIN 2.7 (L) 07/15/2024    PROT 5.2 (L) 07/15/2024    ALKPHOS 108 07/15/2024     (H) 07/15/2024     (H) 07/15/2024    BILITOT 0.4 07/15/2024           ASSESSMENT AND PLAN:     33 y.o.  s/p pLTCS on  at 36.0wga in s/o HELLP syndrome.    HELLP   - diagnosed by ALT/AST >2x ULN (peak 1120/1174), thrombocytopenia (plts 57), elevated LDH (peak 1600)   - labs improving postpartum with downtrending LFTs, plt stable in high 50s-60s, LDH downtrending. Cr has been wnl throughout hospital course. Hb appropriate.  - also had severe range Bps requiring IR tx, last  @2230: nifed 10, hydral 10, lab " 20/20/40/80, hydral 5.   - PO regimen: labetalol 400 BID, currently normotensive.   - asymptomatic   - S/p 24 hrs PP Mg     Elevated troponin, demand ischemia   - peak 297, now down to 67  - EKG without ST changes   - Cards consulted  - Bedside echo on admission with preserved EF, no wall motion abnormalities. Formal TTE pending final read  - suspect likely demand ischemia, cannot rule out NSTEMI. No indication for acute intervention at this time  - s/p postpartum tele, now transferred out of CICU     Postop/postpartum care  - pLTCS, . Pressure dressing in place.   - defer tylenol and and toradol in setting of HELLP, currently using dilaudid PCA with adequate pain control   - breastfeeding, pump at bedside. Lactation consult PRN   - PPBC: declines  - DVT ppx: hep ppx given coags wnl     Dispo: For transfer to postpartum medically complicated service. Likely discharge on POD 3-4 if BP well controlled and otherwise clinically stable    Pt seen and discussed with M Attending, Dr. Richard.   Ana Espino MD PGY3  [facility]  Pager [phone]     Principal Problem:    Severe preeclampsia, third trimester ([facility])  Active Problems:    Preeclampsia ([facility])

## 2024-07-15 NOTE — ANESTHESIA POSTPROCEDURE EVALUATION
Patient: Chapo Martinez    Procedure Summary       Date: 24 Room / Location: [facility] 2 OB    Anesthesia Start:  Anesthesia Stop: 24    Procedure: OBGYN Delivery  Section Diagnosis: (Other (Add Comments))    Surgeons: Brayden Castro MD Responsible Provider: Remy Kamara DO    Anesthesia Type: general ASA Status: 3            Anesthesia Type: general    Vitals Value Taken Time   /79 24 0203   Temp 36.6 °C (97.9 °F) 24 0019   Pulse 74 24 0202   Resp 13 24 0202   SpO2 97 % 242   Vitals shown include unfiled device data.    Anesthesia Post Evaluation    Patient location during evaluation: floor  Patient participation: complete - patient participated  Level of consciousness: awake  Pain management: adequate  Airway patency: patent  Cardiovascular status: acceptable  Respiratory status: acceptable  Hydration status: acceptable  Postoperative Nausea and Vomiting: none  Comments: Neuraxial site assessed. No visible redness or swelling or drainage. Patient able to ambulate and move all extremities without difficulty. Able to void. No complaints of nausea/vomiting. Tolerating PO intake well. No s/sx of PDPH.          No notable events documented.

## 2024-07-15 NOTE — LACTATION NOTE
This note was copied from a baby's chart.  Lactation Consultant Note  Lactation Consultation  Reason for Consult: Initial assessment, NICU baby  Consultant Name: Belkis Perez, RN, IBCLC    Maternal Information  Has mother  before?: No  Infant to breast within first 2 hours of birth?: No  Breastfeeding Delayed Due to: Infant status  Exclusive Pump and Bottle Feed: No (will pump now d/t baby uin the NICU)    Maternal Assessment  Breast Assessment: Medium, Symmetrical, Compressible, Other (Comment) (expresible)  Nipple Assessment: Intact, Erect  Areola Assessment: Normal    Infant Assessment       Feeding Assessment  Unable to assess infant feeding at this time: Other (Comment) (baby in the NICU)    LATCH TOOL       Breast Pump  Pump: Hospital grade electric pump, Double breast pumping, Massage  Frequency: Other (comment) (encouraged her to pump every 3 hours)  Duration: Initiate phase  Breast Shield Size and Type: 21 mm, Other (comment) (she measures 19 mm diameter nipples- advised touse 21 MM flanges)    Other OB Lactation Tools  Lactation Tools: Flanges    Patient Follow-up  Outpatient Lactation Follow-up: Recommended    Other OB Lactation Documentation  Maternal Risk Factors: Extreme tiredness, fatigue or stress,  delivery, Other (comment) (HELLP syndrome)  Infant Risk Factors: Prematurity <37 weeks    Recommendations/Summary  Baby remains in the NICU at this time.   Mo stated she last pumped at 7 AM. Offered to set her up to pump at this time and she was receptive.     Her nipple diameter measures to be 19 MM- advised her to use 21 MM    RE-Oriented mom to pump set up- use- and cleaning of pump parts.     Reviewed the difference between latching and pumping, the benefit of skin to skin, the benefits of breast massage prior to pumping, expectations of volume with pumping, milk storage and cleaning of pump parts.     PI-123 and CDC pump cleaning guide given.     Encouraged her to pump  every 3 hours (8-12 times in a 24 hour period) for 20 minutes on both breasts and to give the baby any pumped breast milk prior to any use of supplement.      She stated she has a breast pump for at home.   Reviewed MANISH Pump option    Questions answered.     Encouraged her to utilize the outpatient lactation resources, if needed.   Contact information given.   [phone] [facility] or [phone] Joey

## 2024-07-15 NOTE — SIGNIFICANT EVENT
Patient meets criteria for home monitoring of blood pressure post discharge.  Reason: current HELLP syndrome. Met with patient to assess for availability of home BP monitor.   Patient does not have access to BP monitor at home.  Pt agreed to order home BP monitor from [employer].   Large BP monitor delivered to room. Patient educated on how to use BP monitor. Patient educated on importance of continuing to monitor BP at home, recording BP on home monitoring log and s/sx of when to call her provider.  Pt verbalized understanding the above information.

## 2024-07-16 LAB
ALBUMIN SERPL BCP-MCNC: 2.7 G/DL (ref 3.4–5)
ALP SERPL-CCNC: 98 U/L (ref 33–110)
ALT SERPL W P-5'-P-CCNC: 244 U/L (ref 7–45)
ANION GAP SERPL CALC-SCNC: 12 MMOL/L (ref 10–20)
APTT PPP: 38 SECONDS (ref 27–38)
AST SERPL W P-5'-P-CCNC: 65 U/L (ref 9–39)
BILIRUB SERPL-MCNC: 0.4 MG/DL (ref 0–1.2)
BLOOD EXPIRATION DATE: NORMAL
BLOOD EXPIRATION DATE: NORMAL
BUN SERPL-MCNC: 11 MG/DL (ref 6–23)
CALCIUM SERPL-MCNC: 7.9 MG/DL (ref 8.6–10.6)
CHLORIDE SERPL-SCNC: 108 MMOL/L (ref 98–107)
CO2 SERPL-SCNC: 24 MMOL/L (ref 21–32)
CREAT SERPL-MCNC: 0.57 MG/DL (ref 0.5–1.05)
DISPENSE STATUS: NORMAL
DISPENSE STATUS: NORMAL
EGFRCR SERPLBLD CKD-EPI 2021: >90 ML/MIN/1.73M*2
ERYTHROCYTE [DISTWIDTH] IN BLOOD BY AUTOMATED COUNT: 13.9 % (ref 11.5–14.5)
FIBRINOGEN PPP-MCNC: 563 MG/DL (ref 200–400)
GLUCOSE SERPL-MCNC: 82 MG/DL (ref 74–99)
GP B STREP GENITAL QL CULT: NORMAL
HCT VFR BLD AUTO: 26.2 % (ref 36–46)
HGB BLD-MCNC: 8.5 G/DL (ref 12–16)
INR PPP: 0.9 (ref 0.9–1.1)
MCH RBC QN AUTO: 30 PG (ref 26–34)
MCHC RBC AUTO-ENTMCNC: 32.4 G/DL (ref 32–36)
MCV RBC AUTO: 93 FL (ref 80–100)
NRBC BLD-RTO: 0.4 /100 WBCS (ref 0–0)
PLATELET # BLD AUTO: 89 X10*3/UL (ref 150–450)
POTASSIUM SERPL-SCNC: 4.1 MMOL/L (ref 3.5–5.3)
PRODUCT BLOOD TYPE: 6200
PRODUCT BLOOD TYPE: 6200
PRODUCT CODE: NORMAL
PRODUCT CODE: NORMAL
PROT SERPL-MCNC: 5.1 G/DL (ref 6.4–8.2)
PROTHROMBIN TIME: 9.6 SECONDS (ref 9.8–12.8)
RBC # BLD AUTO: 2.83 X10*6/UL (ref 4–5.2)
SODIUM SERPL-SCNC: 140 MMOL/L (ref 136–145)
UNIT ABO: NORMAL
UNIT ABO: NORMAL
UNIT NUMBER: NORMAL
UNIT NUMBER: NORMAL
UNIT RH: NORMAL
UNIT RH: NORMAL
UNIT VOLUME: 350
UNIT VOLUME: 350
WBC # BLD AUTO: 16.5 X10*3/UL (ref 4.4–11.3)
XM INTEP: NORMAL
XM INTEP: NORMAL

## 2024-07-16 PROCEDURE — 2500000001 HC RX 250 WO HCPCS SELF ADMINISTERED DRUGS (ALT 637 FOR MEDICARE OP)

## 2024-07-16 PROCEDURE — 80053 COMPREHEN METABOLIC PANEL: CPT

## 2024-07-16 PROCEDURE — 1210000001 HC SEMI-PRIVATE ROOM DAILY

## 2024-07-16 PROCEDURE — 2500000001 HC RX 250 WO HCPCS SELF ADMINISTERED DRUGS (ALT 637 FOR MEDICARE OP): Performed by: STUDENT IN AN ORGANIZED HEALTH CARE EDUCATION/TRAINING PROGRAM

## 2024-07-16 PROCEDURE — 85027 COMPLETE CBC AUTOMATED: CPT

## 2024-07-16 PROCEDURE — 2500000004 HC RX 250 GENERAL PHARMACY W/ HCPCS (ALT 636 FOR OP/ED)

## 2024-07-16 PROCEDURE — 97530 THERAPEUTIC ACTIVITIES: CPT | Mod: GP

## 2024-07-16 PROCEDURE — 85384 FIBRINOGEN ACTIVITY: CPT

## 2024-07-16 PROCEDURE — 85730 THROMBOPLASTIN TIME PARTIAL: CPT

## 2024-07-16 PROCEDURE — 36415 COLL VENOUS BLD VENIPUNCTURE: CPT

## 2024-07-16 PROCEDURE — 97161 PT EVAL LOW COMPLEX 20 MIN: CPT | Mod: GP

## 2024-07-16 RX ORDER — LABETALOL 100 MG/1
200 TABLET, FILM COATED ORAL ONCE
Status: COMPLETED | OUTPATIENT
Start: 2024-07-16 | End: 2024-07-16

## 2024-07-16 RX ORDER — LABETALOL 100 MG/1
600 TABLET, FILM COATED ORAL EVERY 12 HOURS
Status: DISCONTINUED | OUTPATIENT
Start: 2024-07-16 | End: 2024-07-16

## 2024-07-16 RX ORDER — ACETAMINOPHEN 325 MG/1
975 TABLET ORAL EVERY 6 HOURS
Status: DISCONTINUED | OUTPATIENT
Start: 2024-07-16 | End: 2024-07-16

## 2024-07-16 RX ORDER — LABETALOL 100 MG/1
600 TABLET, FILM COATED ORAL EVERY 12 HOURS
Status: DISCONTINUED | OUTPATIENT
Start: 2024-07-17 | End: 2024-07-17 | Stop reason: HOSPADM

## 2024-07-16 RX ORDER — IBUPROFEN 600 MG/1
600 TABLET ORAL EVERY 6 HOURS SCHEDULED
Status: DISCONTINUED | OUTPATIENT
Start: 2024-07-16 | End: 2024-07-17 | Stop reason: HOSPADM

## 2024-07-16 RX ORDER — LABETALOL 100 MG/1
400 TABLET, FILM COATED ORAL ONCE
Status: COMPLETED | OUTPATIENT
Start: 2024-07-16 | End: 2024-07-16

## 2024-07-16 RX ADMIN — IBUPROFEN 600 MG: 600 TABLET, FILM COATED ORAL at 09:18

## 2024-07-16 RX ADMIN — HEPARIN SODIUM 5000 UNITS: 5000 INJECTION INTRAVENOUS; SUBCUTANEOUS at 06:57

## 2024-07-16 RX ADMIN — IBUPROFEN 600 MG: 600 TABLET, FILM COATED ORAL at 14:56

## 2024-07-16 RX ADMIN — HEPARIN SODIUM 5000 UNITS: 5000 INJECTION INTRAVENOUS; SUBCUTANEOUS at 23:52

## 2024-07-16 RX ADMIN — LABETALOL HYDROCHLORIDE 400 MG: 100 TABLET, FILM COATED ORAL at 20:23

## 2024-07-16 RX ADMIN — IBUPROFEN 600 MG: 600 TABLET, FILM COATED ORAL at 20:23

## 2024-07-16 RX ADMIN — LABETALOL HYDROCHLORIDE 400 MG: 100 TABLET, FILM COATED ORAL at 09:18

## 2024-07-16 RX ADMIN — LABETALOL HYDROCHLORIDE 200 MG: 100 TABLET, FILM COATED ORAL at 16:55

## 2024-07-16 RX ADMIN — OXYCODONE HYDROCHLORIDE 5 MG: 5 TABLET ORAL at 01:49

## 2024-07-16 RX ADMIN — HEPARIN SODIUM 5000 UNITS: 5000 INJECTION INTRAVENOUS; SUBCUTANEOUS at 14:56

## 2024-07-16 ASSESSMENT — COGNITIVE AND FUNCTIONAL STATUS - GENERAL
STANDING UP FROM CHAIR USING ARMS: A LITTLE
MOVING FROM LYING ON BACK TO SITTING ON SIDE OF FLAT BED WITH BEDRAILS: A LITTLE
WALKING IN HOSPITAL ROOM: A LITTLE
TURNING FROM BACK TO SIDE WHILE IN FLAT BAD: A LITTLE
CLIMB 3 TO 5 STEPS WITH RAILING: A LITTLE
MOBILITY SCORE: 18
MOVING TO AND FROM BED TO CHAIR: A LITTLE

## 2024-07-16 ASSESSMENT — PAIN SCALES - GENERAL
PAINLEVEL_OUTOF10: 3
PAINLEVEL_OUTOF10: 1
PAINLEVEL_OUTOF10: 2
PAINLEVEL_OUTOF10: 1
PAINLEVEL_OUTOF10: 4
PAINLEVEL_OUTOF10: 1

## 2024-07-16 ASSESSMENT — PAIN DESCRIPTION - DESCRIPTORS
DESCRIPTORS: ACHING;SORE
DESCRIPTORS: ACHING

## 2024-07-16 ASSESSMENT — PAIN - FUNCTIONAL ASSESSMENT: PAIN_FUNCTIONAL_ASSESSMENT: 0-10

## 2024-07-16 ASSESSMENT — ACTIVITIES OF DAILY LIVING (ADL): ADL_ASSISTANCE: INDEPENDENT

## 2024-07-16 NOTE — LACTATION NOTE
Lactation Consultant Note  Lactation Consultation  Reason for Consult: Follow-up assessment, NICU baby, Infant < 6lbs, Late  infant  Consultant Name: ALYSSA Desouza RN IBCLC    Maternal Information  Has mother  before?: No  Infant to breast within first 2 hours of birth?: No  Breastfeeding Delayed Due to: Infant status    Maternal Assessment  Breast Assessment: Medium, Symmetrical, Soft, Compressible  Nipple Assessment: Intact, Erect  Areola Assessment: Normal    Infant Assessment       Feeding Assessment       LATCH TOOL       Breast Pump  Pump: Hospital grade electric pump, Double breast pumping  Frequency: 8-10 times per day  Duration: 15-20 minutes per session  Breast Shield Size and Type: 21 mm  Volume of Milk Production: 10  Units of Volume: mL per session    Other OB Lactation Tools       Patient Follow-up  Inpatient Lactation Follow-up Needed : Yes    Other OB Lactation Documentation  Maternal Risk Factors: Age over 30, primiparity,  delivery,  delivery <37 weeks  Infant Risk Factors: Prematurity <37 weeks, Low birth weight <2500 g    Recommendations/Summary    This pumping was pumping when I came into the room. She reports pumping every three hours. She states that today she has begun being able to collect more colostrum, which she has been sending to the NICU for her infant. She had about 10 ml colostrum in the containers when I came into the room. We reviewed breast pump operation, cleaning and sterilization of breast pump parts. She denies any further pumping questions at this time. She is offered assistance as needed.

## 2024-07-16 NOTE — PROGRESS NOTES
Physical Therapy    Physical Therapy Evaluation & Treatment    Patient Name: Chapo Martinez  MRN: [id number]  Today's Date: 2024   Time Calculation  Start Time: 937  Stop Time:   Time Calculation (min): 35 min    Assessment/Plan   PT Assessment  PT Assessment Results: Decreased strength, Decreased range of motion, Decreased endurance, Impaired balance, Decreased mobility, Orthopedic restrictions, Pain  Rehab Prognosis: Excellent  Barriers to Discharge: none  Evaluation/Treatment Tolerance: Patient limited by fatigue, Patient limited by pain  Medical Staff Made Aware: Yes  Strengths: Attitude of self, Coping skills, Physical health  Barriers to Participation:  (none)  End of Session Communication: Bedside nurse  Assessment Comment: The pt presented with a slightly unsteady gait without an assistive device, but safe and appropriate for home with no PT needs.  End of Session Patient Position: Bed, 3 rail up, Alarm off, not on at start of session   IP OR SWING BED PT PLAN  Inpatient or Swing Bed: Inpatient  PT Plan  Treatment/Interventions: Bed mobility, Transfer training, Gait training, Balance training, Strengthening, Endurance training, Therapeutic exercise, Therapeutic activity, Home exercise program  PT Plan: PT Eval only  PT Eval Only Reason: Safe to return home  PT Frequency: PT eval only  PT Discharge Recommendations: No further acute PT  Equipment Recommended upon Discharge:  (none)  PT Recommended Transfer Status: Stand by assist  PT - OK to Discharge: Yes      Subjective     General Visit Information:  General  Reason for Referral: Severe pre-eclampsia with c/f demand ischemia vs ACS s/p , Low Transverse; Birth date/time: 2024 22:57:00  Past Medical History Relevant to Rehab: No PMH on file  Prior to Session Communication: Bedside nurse  Patient Position Received: Bed, 3 rail up, Alarm off, not on at start of session  Preferred Learning Style: verbal, visual, written  General  Comment: The pt was pleasant, cooperative and willing to participate in therapy.  Home Living:  Home Living  Type of Home: House  Lives With: Spouse, Parent(s) ( and mother-in-law)  Home Adaptive Equipment: None  Home Layout: Multi-level, Stairs to alternate level with rails  Alternate Level Stairs-Rails: Right  Alternate Level Stairs-Number of Steps: 7  Home Access: Stairs to enter without rails  Entrance Stairs-Rails: None  Entrance Stairs-Number of Steps: 3  Bathroom Shower/Tub: Tub/shower unit  Bathroom Toilet: Standard  Bathroom Equipment: Grab bars in shower  Prior Level of Function:  Prior Function Per Pt/Caregiver Report  Level of [place]: Independent with ADLs and functional transfers, Independent with homemaking with ambulation  Receives Help From: Family  ADL Assistance: Independent  Homemaking Assistance: Independent  Ambulatory Assistance: Independent  Vocational: Unemployed  Leisure: Play with dog and watch movies.  Hand Dominance: Left  Prior Function Comments: The pt was independent with all mobility without an assistive device in the household and in the community.  Precautions:  Precautions  Hearing/Visual Limitations: Hearing and vision WFL  Medical Precautions: Abdominal precautions, Fall precautions  Precautions Comment: Pt in compliance with precautions throughout PT session.     Objective   Pain:  Pain Assessment  Pain Assessment: 0-10  0-10 (Numeric) Pain Score: 1  Pain Type: Acute pain, Surgical pain  Pain Location: Abdomen  Pain Orientation: Lower  Pain Descriptors: Aching  Pain Frequency: Constant/continuous  Pain Onset: Ongoing  Clinical Progression: Not changed  Pain Interventions: Ambulation/increased activity, Therapeutic presence  Response to Interventions: Pain increased with bed mobility.  Cognition:  Cognition  Overall Cognitive Status: Within Functional Limits  Orientation Level: Oriented X4    General Assessments:  General Observation  General Observation: The pt  received abdominal precautions and taught how to get in and out of the bed using the log roll technique.     Activity Tolerance  Endurance: Decreased tolerance for upright activites    Sensation  Sensation Comment: WFL    Strength  Strength Comments: Decreased trunk strength  Perception  Inattention/Neglect: Appears intact      Coordination  Movements are Fluid and Coordinated: Yes  Coordination Comment: WFL    Postural Control  Postural Control: Within Functional Limits  Posture Comment: Pt presented with good sitting and standing posture without an assistive device.    Static Sitting Balance  Static Sitting-Balance Support: Bilateral upper extremity supported, Feet supported  Static Sitting-Level of Assistance: Independent  Dynamic Sitting Balance  Dynamic Sitting-Balance Support: Bilateral upper extremity supported, Feet supported  Dynamic Sitting-Comments: Independent    Static Standing Balance  Static Standing-Balance Support: No upper extremity supported  Static Standing-Level of Assistance: Distant supervision  Static Standing-Comment/Number of Minutes: no device  Dynamic Standing Balance  Dynamic Standing-Balance Support: No upper extremity supported  Dynamic Standing-Comments: Supervision assistance without an assistive device.  Functional Assessments:  Bed Mobility  Bed Mobility: Yes  Bed Mobility 1  Bed Mobility 1: Supine to sitting  Level of Assistance 1: Contact guard  Bed Mobility Comments 1: log roll technique  Bed Mobility 2  Bed Mobility  2: Sitting to supine  Level of Assistance 2: Contact guard  Bed Mobility Comments 2: log roll technique    Transfers  Transfer: Yes  Transfer 1  Transfer From 1: Sit to  Transfer to 1: Stand  Transfer Device 1:  (no device)  Transfer Level of Assistance 1: Distant supervision  Transfers 2  Transfer From 2: Stand to  Transfer to 2: Sit  Transfer Device 2:  (no device)  Transfer Level of Assistance 2: Distant supervision    Ambulation/Gait Training  Ambulation/Gait  Training Performed: Yes  Ambulation/Gait Training 1  Surface 1: Level tile  Device 1: No device  Assistance 1: Distant supervision  Quality of Gait 1: Decreased step length (slightly unsteady, decreased dionte, decreased endurance)  Comments/Distance (ft) 1: 75ft  Extremity/Trunk Assessments:  Cervical Spine   Cervical Spine: Within Functional Limits  Lumbar Spine   Lumbar Spine : Exceptions to Funtional Limits  Lumbar Spine Comment: Decreased strength and ROM.    RUE   RUE : Within Functional Limits  LUE   LUE: Within Functional Limits  RLE   RLE : Within Functional Limits  LLE   LLE : Within Functional Limits  Treatments:     Bed Mobility  Bed Mobility: Yes  Bed Mobility 1  Bed Mobility 1: Supine to sitting  Level of Assistance 1: Contact guard  Bed Mobility Comments 1: log roll technique  Bed Mobility 2  Bed Mobility  2: Sitting to supine  Level of Assistance 2: Contact guard  Bed Mobility Comments 2: log roll technique    Ambulation/Gait Training  Ambulation/Gait Training Performed: Yes  Ambulation/Gait Training 1  Surface 1: Level tile  Device 1: No device  Assistance 1: Distant supervision  Quality of Gait 1: Decreased step length (slightly unsteady, decreased dionte, decreased endurance)  Comments/Distance (ft) 1: 75ft  Transfers  Transfer: Yes  Transfer 1  Transfer From 1: Sit to  Transfer to 1: Stand  Transfer Device 1:  (no device)  Transfer Level of Assistance 1: Distant supervision  Transfers 2  Transfer From 2: Stand to  Transfer to 2: Sit  Transfer Device 2:  (no device)  Transfer Level of Assistance 2: Distant supervision  Outcome Measures:  [facility] Basic Mobility  Turning from your back to your side while in a flat bed without using bedrails: A little  Moving from lying on your back to sitting on the side of a flat bed without using bedrails: A little  Moving to and from bed to chair (including a wheelchair): A little  Standing up from a chair using your arms (e.g. wheelchair or bedside chair): A  little  To walk in hospital room: A little  Climbing 3-5 steps with railing: A little  Basic Mobility - Total Score: 18    Education Documentation  Precautions, taught by Ritchie Ashford PT at 7/16/2024 12:01 PM.  Learner: Patient  Readiness: Acceptance  Method: Explanation, Demonstration  Response: Verbalizes Understanding, Demonstrated Understanding    Body Mechanics, taught by Ritchie Ashford, PT at 7/16/2024 12:01 PM.  Learner: Patient  Readiness: Acceptance  Method: Explanation, Demonstration  Response: Verbalizes Understanding, Demonstrated Understanding    Home Exercise Program, taught by Ritchie Ashford, PT at 7/16/2024 12:01 PM.  Learner: Patient  Readiness: Acceptance  Method: Explanation, Demonstration  Response: Verbalizes Understanding, Demonstrated Understanding    Mobility Training, taught by Ritchie Ashford PT at 7/16/2024 12:01 PM.  Learner: Patient  Readiness: Acceptance  Method: Explanation, Demonstration  Response: Verbalizes Understanding, Demonstrated Understanding    Education Comments  No comments found.

## 2024-07-16 NOTE — PROGRESS NOTES
Postpartum Progress Note    Assessment/Plan   Pt is a 33 y.o.yo , now postpartum day 3 from a pLTCS on  at 36.0wga in s/o HELLP syndrome.     HELLP   - diagnosed by ALT/AST >2x ULN (peak 1120/1174), thrombocytopenia (plts 57), elevated LDH (peak 1600)   - labs improving postpartum with downtrending LFTs (65/244), plt uptrending 89, LDH downtrending, now 561. Cr has been wnl throughout hospital course. Hb appropriate.  - S/p IR BP txt: nifed 10, hydral 10, lab 20/20/40/80, hydral 5  - PO antihypertensives: lab 200 BID -> 400 BID  - asymptomatic   - S/p 24 hrs PP Mg     Elevated troponin, demand ischemia   - peak 297, now down to 67  - EKG without ST changes   - Cards consulted  - TTE neg  - suspect likely demand ischemia, cannot rule out NSTEMI. No indication for acute intervention at this time  - s/p postpartum tele, now transferred out of CICU     Postop/postpartum care  - pLTCS, . Pressure dressing in place.   - deferred tylenol and and toradol in setting of HELLP, previously using dilaudid PCA with adequate pain control, now using oxy 5/10, will restart tylenol and ibuprofen due to improvement in labs  - breastfeeding, pump at bedside. Lactation consult PRN   - PPBC: declines  - DVT ppx: hep ppx given coags wnl     Dispo  - Anticipate DC on PPD#3 pending no complications  - Plan for incision check in 2 weeks and PPV in 4-6 weeks with primary OB    Amine Molina, PGY4 OBGYN  Seen and discussed with Dr. Lozano    Subjective   Patient doing well with no HA, no vision changes, no RUQ pain. She has no CP, no SOB. She has minimal bleeding and pain is well controlled with medications.     Objective   Allergies:   Patient has no known allergies.         Last Vitals:  Temp Pulse Resp BP MAP Pulse Ox   36.1 °C (97 °F) 81 20 132/73   (!) 93 %     Vitals Min/Max Last 24 Hours:  Temp  Min: 36.1 °C (97 °F)  Max: 36.9 °C (98.4 °F)  Pulse  Min: 60  Max: 119  Resp  Min: 17  Max: 20  BP  Min: 112/61  Max:  150/85    Intake/Output:     Intake/Output Summary (Last 24 hours) at 3/17/2024 1926  Last data filed at 3/17/2024 1825  Gross per 24 hour   Intake 1600.58 ml   Output 965 ml   Net 635.58 ml       Physical Exam:  General:  AAOx3, No acute distress  Cardiovascular: Warm and well perfused  Respiratory: Normal respiratory effort   Abdominal:  Soft, non-tender, no rebound or guarding  Extremities: Warm, well perfused, no edema, no calf tenderness   Incision: healing well, well approximated.  Fundus: firm and below umbilicus.

## 2024-07-17 ENCOUNTER — APPOINTMENT (OUTPATIENT)
Dept: RADIOLOGY | Facility: CLINIC | Age: 34
End: 2024-07-17
Payer: COMMERCIAL

## 2024-07-17 ENCOUNTER — PHARMACY VISIT (OUTPATIENT)
Dept: PHARMACY | Facility: CLINIC | Age: 34
End: 2024-07-17
Payer: COMMERCIAL

## 2024-07-17 ENCOUNTER — APPOINTMENT (OUTPATIENT)
Dept: OBSTETRICS AND GYNECOLOGY | Facility: CLINIC | Age: 34
End: 2024-07-17
Payer: COMMERCIAL

## 2024-07-17 VITALS
WEIGHT: 190.7 LBS | HEART RATE: 82 BPM | BODY MASS INDEX: 33.79 KG/M2 | SYSTOLIC BLOOD PRESSURE: 143 MMHG | RESPIRATION RATE: 18 BRPM | OXYGEN SATURATION: 98 % | TEMPERATURE: 97.5 F | DIASTOLIC BLOOD PRESSURE: 94 MMHG | HEIGHT: 63 IN

## 2024-07-17 LAB
ALBUMIN SERPL BCP-MCNC: 2.8 G/DL (ref 3.4–5)
ALP SERPL-CCNC: 102 U/L (ref 33–110)
ALT SERPL W P-5'-P-CCNC: 178 U/L (ref 7–45)
ANION GAP SERPL CALC-SCNC: 13 MMOL/L (ref 10–20)
AST SERPL W P-5'-P-CCNC: 58 U/L (ref 9–39)
BILIRUB SERPL-MCNC: 0.4 MG/DL (ref 0–1.2)
BUN SERPL-MCNC: 11 MG/DL (ref 6–23)
CALCIUM SERPL-MCNC: 8 MG/DL (ref 8.6–10.6)
CHLORIDE SERPL-SCNC: 107 MMOL/L (ref 98–107)
CO2 SERPL-SCNC: 23 MMOL/L (ref 21–32)
CREAT SERPL-MCNC: 0.61 MG/DL (ref 0.5–1.05)
EGFRCR SERPLBLD CKD-EPI 2021: >90 ML/MIN/1.73M*2
ERYTHROCYTE [DISTWIDTH] IN BLOOD BY AUTOMATED COUNT: 13.9 % (ref 11.5–14.5)
GLUCOSE SERPL-MCNC: 76 MG/DL (ref 74–99)
HCT VFR BLD AUTO: 26.6 % (ref 36–46)
HGB BLD-MCNC: 8.4 G/DL (ref 12–16)
MCH RBC QN AUTO: 29.8 PG (ref 26–34)
MCHC RBC AUTO-ENTMCNC: 31.6 G/DL (ref 32–36)
MCV RBC AUTO: 94 FL (ref 80–100)
NRBC BLD-RTO: 0.2 /100 WBCS (ref 0–0)
PLATELET # BLD AUTO: 136 X10*3/UL (ref 150–450)
POTASSIUM SERPL-SCNC: 4.4 MMOL/L (ref 3.5–5.3)
PROT SERPL-MCNC: 5.5 G/DL (ref 6.4–8.2)
RBC # BLD AUTO: 2.82 X10*6/UL (ref 4–5.2)
SODIUM SERPL-SCNC: 139 MMOL/L (ref 136–145)
WBC # BLD AUTO: 12.7 X10*3/UL (ref 4.4–11.3)

## 2024-07-17 PROCEDURE — 99238 HOSP IP/OBS DSCHRG MGMT 30/<: CPT | Performed by: STUDENT IN AN ORGANIZED HEALTH CARE EDUCATION/TRAINING PROGRAM

## 2024-07-17 PROCEDURE — 36415 COLL VENOUS BLD VENIPUNCTURE: CPT | Performed by: STUDENT IN AN ORGANIZED HEALTH CARE EDUCATION/TRAINING PROGRAM

## 2024-07-17 PROCEDURE — 2500000001 HC RX 250 WO HCPCS SELF ADMINISTERED DRUGS (ALT 637 FOR MEDICARE OP): Performed by: STUDENT IN AN ORGANIZED HEALTH CARE EDUCATION/TRAINING PROGRAM

## 2024-07-17 PROCEDURE — RXMED WILLOW AMBULATORY MEDICATION CHARGE

## 2024-07-17 PROCEDURE — 85027 COMPLETE CBC AUTOMATED: CPT | Performed by: STUDENT IN AN ORGANIZED HEALTH CARE EDUCATION/TRAINING PROGRAM

## 2024-07-17 PROCEDURE — 2500000004 HC RX 250 GENERAL PHARMACY W/ HCPCS (ALT 636 FOR OP/ED)

## 2024-07-17 PROCEDURE — 80053 COMPREHEN METABOLIC PANEL: CPT | Performed by: STUDENT IN AN ORGANIZED HEALTH CARE EDUCATION/TRAINING PROGRAM

## 2024-07-17 RX ORDER — POLYETHYLENE GLYCOL 3350 17 G/17G
17 POWDER, FOR SOLUTION ORAL DAILY
Qty: 238 G | Refills: 0 | Status: SHIPPED | OUTPATIENT
Start: 2024-07-17

## 2024-07-17 RX ORDER — OXYCODONE HYDROCHLORIDE 5 MG/1
5 TABLET ORAL EVERY 6 HOURS PRN
Qty: 15 TABLET | Refills: 0 | Status: SHIPPED | OUTPATIENT
Start: 2024-07-17

## 2024-07-17 RX ORDER — IBUPROFEN 600 MG/1
600 TABLET ORAL EVERY 6 HOURS SCHEDULED
Qty: 120 TABLET | Refills: 0 | Status: SHIPPED | OUTPATIENT
Start: 2024-07-17 | End: 2024-08-16

## 2024-07-17 RX ORDER — LABETALOL 100 MG/1
600 TABLET, FILM COATED ORAL EVERY 12 HOURS
Qty: 360 TABLET | Refills: 1 | Status: SHIPPED | OUTPATIENT
Start: 2024-07-17 | End: 2024-09-15

## 2024-07-17 RX ADMIN — LABETALOL HYDROCHLORIDE 600 MG: 100 TABLET, FILM COATED ORAL at 08:25

## 2024-07-17 RX ADMIN — HEPARIN SODIUM 5000 UNITS: 5000 INJECTION INTRAVENOUS; SUBCUTANEOUS at 06:36

## 2024-07-17 RX ADMIN — IBUPROFEN 600 MG: 600 TABLET, FILM COATED ORAL at 03:48

## 2024-07-17 ASSESSMENT — PAIN SCALES - GENERAL: PAINLEVEL_OUTOF10: 2

## 2024-07-17 NOTE — DISCHARGE SUMMARY
Discharge Summary    Admission Date: 2024  Discharge Date: 2024    Discharge Diagnosis  Severe preeclampsia, third trimester ([facility]HS-HCC)    Hospital Course  Delivery Date: 2024 10:57 PM  Delivery type: , Low Transverse   GA at delivery: 36w0d  Outcome: Living  Anesthesia during delivery: General  Intrapartum complications:  HELLP, demand tropenemia  Feeding method: Breastfeeding Status: Yes     Procedures:  CS  Contraception at discharge: none    34yo  at 36.0wga admitted as a transfer from [facility] in the setting of HELLP syndrome. She underwent a primary LTCS under GA on  for NRFHT, please see delivery note for details. Her HELLP syndrome had lab abnormalities which peaked at ALT/AST at 1120/1174, Platelets nadired at 57, and LDH peaked at 1688. All of her labs were downtrending prior to discharge. She received 24 hours of postpartum magnesium and stabilized on a BP regimen of labetalol 600mg BID. She also had chest pain and significant tropenemia to 297, EKG without ST changes. She recovered in the CICU and had a normal TTE, cardiology suspected demand ischemia vs NSTEMI. Cardiology follow up was requested on discharge. She was breastfeeding/pumping, declined contraception, and pain was well controlled on ibuprofen and oxycodone. She was discharged home on POD4 wih a plan for a BP check in 2-5 days and a postpartum check in 2 weeks. She was undecided about follow up with Dr. Sawyer vs Tonja and information for both was given on discharge.    Pertinent Physical Exam At Time of Discharge  General: no acute distress  HEENT: normocephalic, atraumatic  Heart: warm and well perfused  Lungs: breathing comfortably on room air  Abdomen: Pfannenstiel incision c/d/I, minimal amount of ecchymoses surrounding incision without erythema or fluctuance  Extremities: moving all extremities  Neuro: awake and conversant  Psych: appropriate mood and affect      Discharge Meds     Your medication  list        START taking these medications        Instructions Last Dose Given Next Dose Due   ibuprofen 600 mg tablet      Take 1 tablet (600 mg) by mouth every 6 hours.       labetalol 100 mg tablet  Commonly known as: Normodyne      Take 6 tablets (600 mg) by mouth every 12 hours.       oxyCODONE 5 mg immediate release tablet  Commonly known as: Roxicodone      Take 1 tablet (5 mg) by mouth every 6 hours if needed for severe pain (7 - 10) for up to 15 doses. Acute postoperative pain       polyethylene glycol 17 gram packet  Commonly known as: Glycolax, Miralax      Take 17 g by mouth once daily.              CONTINUE taking these medications        Instructions Last Dose Given Next Dose Due   PRENATABS RX ORAL                     Where to Get Your Medications        These medications were sent to [facility]  [address] [place] [address]      Hours: 8:30 AM to 5 PM Mon-Fri Phone: [phone]   ibuprofen 600 mg tablet  labetalol 100 mg tablet  oxyCODONE 5 mg immediate release tablet  polyethylene glycol 17 gram packet          Complications Requiring Follow-Up  HELLP syndrome  Tropenemia  Postpartum care    Test Results Pending At Discharge  Pending Labs       Order Current Status    Surgical Pathology Exam - [facility] In process            Outpatient Follow-Up  No future appointments.        Alexa Levine MD

## 2024-07-17 NOTE — DISCHARGE INSTR - AVS FIRST PAGE
Any woman can have complications after a birth including a blood clot, a heart problem, hypertensive disorder/eclampsia, depression, hemorrhage, or infection. Notify all providers of your delivery date up to one year after birth.*       Call 911 or go to nearest emergency room right away if you have:   PAIN or pressure in chest;   OBSTRUCTED breathing or shortness of breath;   SEIZURES;   THOUGHTS of hurting yourself or someone else; heart palpitations/racing; change in alertness/confusion.    Call your provider if you have:   BLEEDING, soaking through a pad/hour, or blood clots the size of an egg or bigger;   INCISION (episiotomy stitches or  site) that is not healing (increased redness, pain, drainage/pus, or separation) if you had one;   RED or swollen leg/calf that is painful or warm to touch, especially in one leg more than the other;   TEMPERATURE of 100.4 F or higher or chills;   HEADACHE that does not get better with medicine, rest or hydration, or bad headache with vision changes   like spots or flashing lights; increased swelling of face, hands or legs; severe cramps or upper right belly pain; red or swollen breast that is painful or warm to touch; an unusual, foul odor from your vaginal discharge; pain, burning, or difficulty during urination; severe constipation (more than 5 days); feelings of depression (such as depressed mood, loss of interest in enjoyable things, unable to care for yourself, trouble sleeping, lack of appetite, or feeling worthless).     If you can't reach your provider or symptoms worsen, call 911 or go to nearest emergency room.   *Information obtained from [facility]: Save Your Life: Get Care for These POST-BIRTH Warning Signs    Please check your blood pressure twice a day; when you're resting, sitting upright, feet flat on the floor, in left arm resting on a table. If the top number is greater than 160 and/or the bottom number is greater than 110, call your OB provider  immediately or come to the nearest emergency room. If your blood pressure is between 150/100 and 160/110, rest for 1 hour and then recheck you blood pressure. If on recheck it is still between 150/100 and 160/110, call your OB provider or go to the nearest emergency room.      A few days after your discharge, one of our care coordinators may be calling you to see how things have been going at home. This phone call also gives you the opportunity to clarify any information on your discharge instructions or ask any questions that may have come up since leaving the hospital.

## 2024-07-18 LAB
LABORATORY COMMENT REPORT: NORMAL
PATH REPORT.FINAL DX SPEC: NORMAL
PATH REPORT.GROSS SPEC: NORMAL
PATH REPORT.RELEVANT HX SPEC: NORMAL
PATH REPORT.TOTAL CANCER: NORMAL

## 2024-07-19 ENCOUNTER — POSTPARTUM VISIT (OUTPATIENT)
Dept: OBSTETRICS AND GYNECOLOGY | Facility: CLINIC | Age: 34
End: 2024-07-19
Payer: COMMERCIAL

## 2024-07-19 ENCOUNTER — LACTATION ENCOUNTER (OUTPATIENT)
Dept: OTHER | Facility: HOSPITAL | Age: 34
End: 2024-07-19

## 2024-07-19 VITALS
DIASTOLIC BLOOD PRESSURE: 94 MMHG | WEIGHT: 183 LBS | HEIGHT: 63 IN | BODY MASS INDEX: 32.43 KG/M2 | SYSTOLIC BLOOD PRESSURE: 138 MMHG

## 2024-07-19 DIAGNOSIS — O14.13 SEVERE PREECLAMPSIA, THIRD TRIMESTER (HHS-HCC): ICD-10-CM

## 2024-07-19 PROBLEM — O14.90 PREECLAMPSIA (HHS-HCC): Status: RESOLVED | Noted: 2024-07-13 | Resolved: 2024-07-19

## 2024-07-19 ASSESSMENT — EDINBURGH POSTNATAL DEPRESSION SCALE (EPDS)
I HAVE BEEN SO UNHAPPY THAT I HAVE HAD DIFFICULTY SLEEPING: NOT AT ALL
I HAVE LOOKED FORWARD WITH ENJOYMENT TO THINGS: RATHER LESS THAN I USED TO
I HAVE FELT SCARED OR PANICKY FOR NO GOOD REASON: NO, NOT MUCH
I HAVE BLAMED MYSELF UNNECESSARILY WHEN THINGS WENT WRONG: NO, NEVER
THINGS HAVE BEEN GETTING ON TOP OF ME: NO, I HAVE BEEN COPING AS WELL AS EVER
THE THOUGHT OF HARMING MYSELF HAS OCCURRED TO ME: NEVER
I HAVE FELT SAD OR MISERABLE: NO, NOT AT ALL
I HAVE BEEN ABLE TO LAUGH AND SEE THE FUNNY SIDE OF THINGS: NOT QUITE SO MUCH NOW
I HAVE BEEN SO UNHAPPY THAT I HAVE BEEN CRYING: NO, NEVER

## 2024-07-19 NOTE — LACTATION NOTE
This note was copied from a baby's chart.  Lactation Consultant Note  Lactation Consultation   Radha Valladares RN IBCLC    Recommendations/Summary       I spoke with mom via the phone this evening to explained availability of Lactation Consult services.   Provided written patient education instruction materials on the listed topics: LAY, Benefits of mother's own milk for the infant, breast massage and hand expression,CDC pump cleaning & sanitizing guidelines.  Mom reports that she has spoken with LC services at [facility] prior to her discharge.  She reports that she is getting 6 to 8 ounces of milk with each pumping effort.  She had no questions at this time.  She would like to have baby directly nurse and would like to have help with this when she visits. Invited to contact Lactation Consult services as needed.

## 2024-07-19 NOTE — PROGRESS NOTES
"Subjective   Patient ID: Chapo Martinez is a 33 y.o. female who presents for Postpartum Follow-up (1 week follow up/- BP Check/c section 7/13/24 @ [facility]/Girl, \"Harleen\"/Brayden Castro delivered//BP's have been high at home, 138/94 today in office/- feels ok//Baby still in hospital, patient is pumping and plans o breastfeed when at home// in room with patient).  HPI  As stated  Review of Systems  10 systems have been reviewed and are negative and noncontributory to the patient current ailments.    Objective   Physical Exam  Abdominal incision well approximated  Assessment/Plan   Diagnoses and all orders for this visit:  Postpartum state ([facility]-HCC)  -continue labetalol 600mg bid RTO 3 weeks Follow up appointment 2 weeks with Cardiology.  -Patient will call if home bp> 160/90       Quoc Sawyer,  07/19/24 12:25 PM   "

## 2024-07-20 LAB
ATRIAL RATE: 70 BPM
ATRIAL RATE: 95 BPM
P AXIS: 36 DEGREES
P AXIS: 69 DEGREES
P OFFSET: 194 MS
P OFFSET: 198 MS
P ONSET: 142 MS
P ONSET: 149 MS
PR INTERVAL: 144 MS
PR INTERVAL: 158 MS
Q ONSET: 221 MS
Q ONSET: 221 MS
QRS COUNT: 11 BEATS
QRS COUNT: 16 BEATS
QRS DURATION: 74 MS
QRS DURATION: 78 MS
QT INTERVAL: 346 MS
QT INTERVAL: 370 MS
QTC CALCULATION(BAZETT): 399 MS
QTC CALCULATION(BAZETT): 434 MS
QTC FREDERICIA: 389 MS
QTC FREDERICIA: 403 MS
R AXIS: 61 DEGREES
R AXIS: 74 DEGREES
T AXIS: 39 DEGREES
T AXIS: 43 DEGREES
T OFFSET: 394 MS
T OFFSET: 406 MS
VENTRICULAR RATE: 70 BPM
VENTRICULAR RATE: 95 BPM

## 2024-07-25 ENCOUNTER — LACTATION ENCOUNTER (OUTPATIENT)
Dept: OTHER | Facility: HOSPITAL | Age: 34
End: 2024-07-25

## 2024-07-25 NOTE — LACTATION NOTE
This note was copied from a baby's chart.  Lactation Consultant Note  Lactation Consultation       Maternal Information       Maternal Assessment       Infant Assessment       Feeding Assessment       LATCH TOOL       Breast Pump       Other OB Lactation Tools       Patient Follow-up       Other OB Lactation Documentation       Recommendations/Summary  Mom states she is pumping every 4-5 hrs and getting 6oz with each pumping session. Encouraged mom to monitor her volume and if it starts to drop to get back on a 3 hr schedule. Talked about supply and demand. Mom states she understands.

## 2024-07-29 ENCOUNTER — APPOINTMENT (OUTPATIENT)
Dept: OBSTETRICS AND GYNECOLOGY | Facility: CLINIC | Age: 34
End: 2024-07-29
Payer: COMMERCIAL

## 2024-08-07 ENCOUNTER — APPOINTMENT (OUTPATIENT)
Dept: OBSTETRICS AND GYNECOLOGY | Facility: CLINIC | Age: 34
End: 2024-08-07
Payer: COMMERCIAL

## 2024-08-07 VITALS
SYSTOLIC BLOOD PRESSURE: 112 MMHG | DIASTOLIC BLOOD PRESSURE: 80 MMHG | BODY MASS INDEX: 31.01 KG/M2 | HEIGHT: 63 IN | WEIGHT: 175 LBS

## 2024-08-07 DIAGNOSIS — O14.13 SEVERE PREECLAMPSIA, THIRD TRIMESTER (HHS-HCC): ICD-10-CM

## 2024-08-07 PROCEDURE — 0503F POSTPARTUM CARE VISIT: CPT | Performed by: OBSTETRICS & GYNECOLOGY

## 2024-08-07 RX ORDER — LABETALOL 100 MG/1
300 TABLET, FILM COATED ORAL EVERY 12 HOURS
Qty: 84 TABLET | Refills: 0 | Status: SHIPPED | OUTPATIENT
Start: 2024-08-07 | End: 2024-08-21

## 2024-08-07 ASSESSMENT — PAIN SCALES - GENERAL: PAINLEVEL: 0-NO PAIN

## 2024-08-07 NOTE — PROGRESS NOTES
"Subjective   Patient ID: Chapo Martinez is a 33 y.o. female who presents for Postpartum Follow-up (2 week inc check//7/13/24 c section /Girl, \"Harleen\"//Castro delivered//No complaints - BP's have been good at home/Chaperone declined).  HPI  No voiced complaints.  Review of Systems    Objective   Physical Exam  /80  Assessment/Plan   Diagnoses and all orders for this visit:  Postpartum state ([facility])  -Will decrease current labetalol dosage to 300 mg every 12 hours.  Will continue monitor her temperature at home.  Will discontinue labetalol provided blood pressures are acceptable limits in 2 weeks.  Repeat office visit in 3 weeks         Quoc Sawyer DO 08/07/24 11:20 AM   "

## 2024-08-08 ENCOUNTER — LACTATION ENCOUNTER (OUTPATIENT)
Dept: OTHER | Facility: HOSPITAL | Age: 34
End: 2024-08-08

## 2024-08-08 NOTE — LACTATION NOTE
This note was copied from a baby's chart.  Lactation Consultant Note  Lactation Consultation  Reason for Consult: Follow-up assessment  Consultant Name: Idalmis Roberts    Maternal Information       Maternal Assessment  Breast Assessment: Medium  Nipple Assessment: Intact  Areola Assessment: Normal    Infant Assessment       Feeding Assessment  Feeding Method: Nursing at the breast  Feeding Position: Cross - cradle  Suck/Feeding: Unsustained  Latch Assessment: Mouth not open wide enough, Instructed on deep latch    LATCH TOOL  Latch: Too sleepy or reluctant, no latch achieved  Audible Swallowing: None  Type of Nipple: Everted (After stimulation)  Comfort (Breast/Nipple): Soft/non-tender  Hold (Positioning): Minimal assist, teach one side, mother does other, staff holds  LATCH Score: 5    Breast Pump  Pump: Hospital grade electric pump  Frequency: 8-10 times per day  Duration: 15-20 minutes per session    Other OB Lactation Tools       Patient Follow-up       Other OB Lactation Documentation  Infant Risk Factors: Prematurity <37 weeks    Recommendations/Summary  In rounds plan is for mom to pump prior to placing infant to breast. Reason behind pumping first is for infant to practice at the breast for 5min before receiving her bottle. Infant getting continuous feeds on top of bolus feeds for instable glucose levels.  Mom at bedside and updated on feeding plan. Mom aware this plan may change day to day and is ok as long as she is updated.

## 2024-08-16 ENCOUNTER — APPOINTMENT (OUTPATIENT)
Dept: CARDIOLOGY | Facility: CLINIC | Age: 34
End: 2024-08-16
Payer: COMMERCIAL

## 2024-08-26 ENCOUNTER — POSTPARTUM VISIT (OUTPATIENT)
Dept: OBSTETRICS AND GYNECOLOGY | Facility: CLINIC | Age: 34
End: 2024-08-26
Payer: COMMERCIAL

## 2024-08-26 ENCOUNTER — APPOINTMENT (OUTPATIENT)
Dept: CARDIOLOGY | Facility: CLINIC | Age: 34
End: 2024-08-26
Payer: COMMERCIAL

## 2024-08-26 VITALS
WEIGHT: 176 LBS | HEIGHT: 63 IN | SYSTOLIC BLOOD PRESSURE: 122 MMHG | OXYGEN SATURATION: 99 % | DIASTOLIC BLOOD PRESSURE: 76 MMHG | HEART RATE: 88 BPM | BODY MASS INDEX: 31.18 KG/M2

## 2024-08-26 VITALS
WEIGHT: 175 LBS | HEIGHT: 63 IN | BODY MASS INDEX: 31.01 KG/M2 | DIASTOLIC BLOOD PRESSURE: 78 MMHG | SYSTOLIC BLOOD PRESSURE: 110 MMHG

## 2024-08-26 DIAGNOSIS — I10 TYPE 2 MYOCARDIAL INFARCTION DUE TO HYPERTENSION (MULTI): Primary | ICD-10-CM

## 2024-08-26 DIAGNOSIS — O14.23 HEMOLYSIS, ELEVATED LIVER ENZYMES, AND LOW PLATELET (HELLP) SYNDROME DURING PREGNANCY IN THIRD TRIMESTER (HHS-HCC): ICD-10-CM

## 2024-08-26 DIAGNOSIS — I21.A1 TYPE 2 MYOCARDIAL INFARCTION DUE TO HYPERTENSION (MULTI): Primary | ICD-10-CM

## 2024-08-26 DIAGNOSIS — I21.4 NSTEMI (NON-ST ELEVATED MYOCARDIAL INFARCTION) (MULTI): ICD-10-CM

## 2024-08-26 PROCEDURE — 3078F DIAST BP <80 MM HG: CPT | Performed by: INTERNAL MEDICINE

## 2024-08-26 PROCEDURE — 93000 ELECTROCARDIOGRAM COMPLETE: CPT | Performed by: INTERNAL MEDICINE

## 2024-08-26 PROCEDURE — 99204 OFFICE O/P NEW MOD 45 MIN: CPT | Performed by: INTERNAL MEDICINE

## 2024-08-26 PROCEDURE — 3008F BODY MASS INDEX DOCD: CPT | Performed by: INTERNAL MEDICINE

## 2024-08-26 PROCEDURE — 3074F SYST BP LT 130 MM HG: CPT | Performed by: INTERNAL MEDICINE

## 2024-08-26 PROCEDURE — 1036F TOBACCO NON-USER: CPT | Performed by: INTERNAL MEDICINE

## 2024-08-26 ASSESSMENT — EDINBURGH POSTNATAL DEPRESSION SCALE (EPDS)
I HAVE BEEN SO UNHAPPY THAT I HAVE BEEN CRYING: NO, NEVER
I HAVE LOOKED FORWARD WITH ENJOYMENT TO THINGS: AS MUCH AS I EVER DID
TOTAL SCORE: 0
THE THOUGHT OF HARMING MYSELF HAS OCCURRED TO ME: NEVER
I HAVE BEEN SO UNHAPPY THAT I HAVE HAD DIFFICULTY SLEEPING: NOT AT ALL
I HAVE FELT SAD OR MISERABLE: NO, NOT AT ALL
I HAVE BLAMED MYSELF UNNECESSARILY WHEN THINGS WENT WRONG: NO, NEVER
I HAVE FELT SCARED OR PANICKY FOR NO GOOD REASON: NO, NOT AT ALL
I HAVE BEEN ANXIOUS OR WORRIED FOR NO GOOD REASON: NO, NOT AT ALL
I HAVE BEEN ABLE TO LAUGH AND SEE THE FUNNY SIDE OF THINGS: AS MUCH AS I ALWAYS COULD
THINGS HAVE BEEN GETTING ON TOP OF ME: NO, I HAVE BEEN COPING AS WELL AS EVER

## 2024-08-26 ASSESSMENT — ENCOUNTER SYMPTOMS
OCCASIONAL FEELINGS OF UNSTEADINESS: 0
LOSS OF SENSATION IN FEET: 0
DEPRESSION: 0

## 2024-08-26 ASSESSMENT — PAIN SCALES - GENERAL
PAINLEVEL: 0-NO PAIN
PAINLEVEL: 0-NO PAIN

## 2024-08-26 NOTE — PROGRESS NOTES
Name : Chapo Martinez    : 1990   MRN : [id number]   ENC Date : 24     Reason for visit: Elevated troponin    Assessment and Plan:  Type II myocardial infarction in the setting of preeclampsia/help syndrome: I explained that the troponin elevation while in the hospital was not a true heart attack from a coronary artery disease standpoint.  I explained that is more of an injury to the heart due to the elevated blood pressure.  I explained that her echocardiograms in the hospital showed no wall motion but no damage to the heart.  At this point there is no indication for any further testing.  Gestational hypertension: Patient will be at increased risk for preeclampsia with subsequent pregnancies and also at increased risk for hypertension at some point during her lifetime.  At this point however her blood pressure is very nicely controlled on no medications.  I encouraged her to continue with a low-sodium diet which has been shown to decrease incidence of hypertension and to continue with exercise.  Disp: RTO on an as-needed basis      HPI:  Patient is seen today in follow-up from a hospitalization in July when she developed preeclampsia/help syndrome.  She had a  at 36 weeks.  Unfortunately her baby was born underweight and remains hospitalized.  She had a slight troponin elevation with her hospitalization.  The peak troponin was 266.  2 echocardiograms were performed which showed no evidence of wall motion abnormalities.  EKG today is normal.  She has not had any chest pain since discharge.  Blood pressure is well-controlled and she is no longer taking blood pressure medications.      Problem List:   Patient Active Problem List   Diagnosis   (none) - all problems resolved or deleted        Meds:   Current Outpatient Medications on File Prior to Visit   Medication Sig Dispense Refill    prenatal vit,calc76/iron/folic (PRENATABS RX ORAL) Take by mouth.      labetalol (Normodyne) 100 mg tablet  Take 3 tablets (300 mg) by mouth every 12 hours for 14 days. 84 tablet 0    oxyCODONE (Roxicodone) 5 mg immediate release tablet Take 1 tablet (5 mg) by mouth every 6 hours if needed for severe pain (7 - 10) for up to 15 doses. Acute postoperative pain (Patient not taking: Reported on 8/26/2024) 15 tablet 0    [DISCONTINUED] polyethylene glycol (Glycolax, Miralax) 17 gram/dose powder Dissolve 1 capful (17 g) in 8 ounces of water and take by mouth once daily. (Patient not taking: Reported on 8/26/2024) 238 g 0     No current facility-administered medications on file prior to visit.       All: No Known Allergies    Fam Hx: No family history on file.    Soc Hx:   Social History     Socioeconomic History    Marital status:      Spouse name: Alex    Number of children: 1    Years of education: Not on file    Highest education level: Not on file   Occupational History    Occupation: stay at home mom   Tobacco Use    Smoking status: Never     Passive exposure: Never    Smokeless tobacco: Never   Vaping Use    Vaping status: Never Used   Substance and Sexual Activity    Alcohol use: Not Currently    Drug use: Never    Sexual activity: Not Currently     Partners: Male     Birth control/protection: None   Other Topics Concern    Not on file   Social History Narrative    Not on file     Social Determinants of Health     Financial Resource Strain: Patient Declined (7/13/2024)    Overall Financial Resource Strain (CARDIA)     Difficulty of Paying Living Expenses: Patient declined   Food Insecurity: Not on file   Transportation Needs: Patient Declined (7/13/2024)    PRAPARE - Transportation     Lack of Transportation (Medical): Patient declined     Lack of Transportation (Non-Medical): Patient declined   Physical Activity: Not on file   Stress: Not on file   Social Connections: Not on file   Intimate Partner Violence: Not on file       VS: /76 (BP Location: Left arm, Patient Position: Sitting)   Pulse 88   Ht 1.6 m  "(5' 3\")   Wt 79.8 kg (176 lb)   SpO2 99%   BMI 31.18 kg/m²      Physical Exam  Vitals reviewed.   Constitutional:       Appearance: Normal appearance.   Eyes:      Pupils: Pupils are equal, round, and reactive to light.   Neck:      Vascular: No JVD.   Cardiovascular:      Rate and Rhythm: Normal rate and regular rhythm.      Pulses: Normal pulses.      Heart sounds: No murmur heard.     No gallop.   Pulmonary:      Effort: No respiratory distress.      Breath sounds: No wheezing or rales.   Abdominal:      General: Abdomen is flat. There is no distension.      Palpations: Abdomen is soft.   Musculoskeletal:         General: No swelling.      Right lower leg: No edema.      Left lower leg: No edema.   Neurological:      General: No focal deficit present.      Mental Status: She is alert.   Psychiatric:         Mood and Affect: Mood normal.            Encounter Date: 07/13/24   ECG 12 lead   Result Value    Ventricular Rate 70    Atrial Rate 70    SC Interval 144    QRS Duration 78    QT Interval 370    QTC Calculation(Bazett) 399    P Axis 36    R Axis 61    T Axis 43    QRS Count 11    Q Onset 221    P Onset 149    P Offset 198    T Offset 406    QTC Fredericia 389    Narrative    Normal sinus rhythm  Normal ECG  No previous ECGs available  Confirmed by Carlos Shepard ([id number]) on 7/20/2024 11:21:56 AM        ECHO: Results for orders placed during the hospital encounter of 07/13/24    Transthoracic Echo (TTE) Complete    Narrative  [facility], [address] [place] [address]  Tel [phone] and Fax [phone]    TRANSTHORACIC ECHOCARDIOGRAM REPORT      Patient Name:      HOA Melissa Physician:    [id number] Galo May MD  Study Date:        7/15/2024            Ordering Provider:    [id number] RYLEE ANTUNEZ  UKAIGWE  MRN/PID:           [id number]             Fellow:  Accession#:        [id number]         Nurse:                Marija Bridges RN  Date of Birth/Age: 1990 / 33 years " Sonographer:          Agnes Rodgers  RDCS, RVT  Gender:            F                    Additional Staff:  Height:            160.02 cm            Admit Date:           7/13/2024  Weight:            86.18 kg             Admission Status:     Inpatient -  Routine  BSA / BMI:         1.89 m2 / 33.66      Encounter#:           [id number]  kg/m2  Blood Pressure:    130/83 mmHg          Department Location:  [facility]    Study Type:    TRANSTHORACIC ECHO (TTE) COMPLETE  Diagnosis/ICD: Non ST elevation (NSTEMI) myocardial infarction-I21.4  Indication:    NSTEMI.  CPT Code:      Echo Complete w Full Doppler-[id number]    Patient History:  Pertinent History: Pre-eclampsia.    Study Detail: The following Echo studies were performed: 2D, M-Mode, Doppler and  color flow. Technically challenging study due to prominent lung  artifact. Definity used as a contrast agent for endocardial border  definition. Total contrast used for this procedure was 3 mL via IV  push. Unable to obtain subcostal view.      PHYSICIAN INTERPRETATION:  Left Ventricle: The left ventricular systolic function is normal, with a visually estimated ejection fraction of 70-75%. There are no regional wall motion abnormalities. The left ventricular cavity size is normal. There is no evidence of left ventricular hypertrophy. Spectral Doppler shows a normal pattern of left ventricular diastolic filling.  Left Atrium: The left atrium is normal in size.  Right Ventricle: The right ventricle is normal in size. There is normal right ventricular global systolic function.  Right Atrium: The right atrium is normal in size.  Aortic Valve: The aortic valve is trileaflet. There is no evidence of aortic valve regurgitation. The peak instantaneous gradient of the aortic valve is 12.2 mmHg.  Mitral Valve: The mitral valve is normal in structure. There is trace mitral valve regurgitation.  Tricuspid Valve: The tricuspid valve is structurally normal. There is trace  tricuspid regurgitation. The right ventricular systolic pressure is unable to be estimated.  Pulmonic Valve: The pulmonic valve is structurally normal. There is trace pulmonic valve regurgitation.  Pericardium: There is no pericardial effusion noted.  Aorta: The aortic root is normal.  Pulmonary Artery: The pulmonary artery is not well visualized.  In comparison to the previous echocardiogram(s): Compared with study dated 7/13/2024, no significant change.      CONCLUSIONS:  1. The left ventricular systolic function is normal, with a visually estimated ejection fraction of 70-75%.  2. There is no evidence of left ventricular hypertrophy.  3. There is normal right ventricular global systolic function.  4. The pulmonary artery is not well visualized.    QUANTITATIVE DATA SUMMARY:  2D MEASUREMENTS:  Normal Ranges:  Ao Root d:     2.70 cm   (2.0-3.7cm)  LAs:           3.71 cm   (2.7-4.0cm)  IVSd:          1.00 cm   (0.6-1.1cm)  LVPWd:         0.90 cm   (0.6-1.1cm)  LVIDd:         4.50 cm   (3.9-5.9cm)  LVIDs:         2.80 cm  LV Mass Index: 75.5 g/m2  LV % FS        37.8 %    LA VOLUME:  Normal Ranges:  LA Vol A4C:        42.4 ml    (22+/-6mL/m2)  LA Vol A2C:        35.4 ml  LA Vol BP:         41.5 ml  LA Vol Index A4C:  22.4ml/m2  LA Vol Index A2C:  18.7 ml/m2  LA Vol Index BP:   21.9 ml/m2  LA Area A4C:       14.3 cm2  LA Area A2C:       14.0 cm2  LA Major Axis A4C: 4.1 cm  LA Major Axis A2C: 4.7 cm    RA VOLUME BY A/L METHOD:  Normal Ranges:  RA Area A4C: 12.7 cm2    LV SYSTOLIC FUNCTION BY 2D PLANIMETRY (MOD):  Normal Ranges:  EF-A4C View:    31 % (>=55%)  EF-A2C View:    73 %  EF-Biplane:     77 %  EF-Visual:      73 %  LV EF Reported: 73 %    LV DIASTOLIC FUNCTION:  Normal Ranges:  MV Peak E:        1.23 m/s   (0.7-1.2 m/s)  MV Peak A:        0.71 m/s   (0.42-0.7 m/s)  E/A Ratio:        1.73       (1.0-2.2)  MV e'             0.120 m/s  (>8.0)  MV lateral e'     0.13 m/s  MV medial e'      0.11 m/s  MV A Dur:          87.66 msec  E/e' Ratio:       10.21      (<8.0)  PulmV Sys Gabe:    47.89 cm/s  PulmV Salazar Gabe:   38.85 cm/s  PulmV S/D Gabe:    1.23  PulmV A Revs Gabe: 35.70 cm/s  PulmV A Revs Dur: 87.66 msec    MITRAL VALVE:  Normal Ranges:  MV DT: 141 msec (150-240msec)    AORTIC VALVE:  Normal Ranges:  AoV Vmax:      1.75 m/s  (<=1.7m/s)  AoV Peak P.2 mmHg (<20mmHg)  LVOT Diameter: 1.92 cm   (1.8-2.4cm)      RIGHT VENTRICLE:  RV Basal 3.50 cm  RV Mid   1.90 cm  RV Major 8.0 cm  TAPSE:   28.0 mm  RV s'    0.19 m/s    PULMONIC VALVE:  Normal Ranges:  PV Max Gabe: 1.2 m/s  (0.6-0.9m/s)  PV Max P.4 mmHg    Pulmonary Veins:  PulmV A Revs Dur: 87.66 msec  PulmV A Revs Gabe: 35.70 cm/s  PulmV Salazar Gabe:   38.85 cm/s  PulmV S/D Gabe:    1.23  PulmV Sys Gabe:    47.89 cm/s      [id number] Galo May MD  Electronically signed on 7/15/2024 at 1:40:31 PM        ** Final **    ECG: Normal sinus rhythm.  Normal ECG    Hernando Cooney MD

## 2024-08-26 NOTE — PROGRESS NOTES
"Subjective   Patient ID: Chapo Martinez is a 33 y.o. female who presents for Postpartum Care (6 week pp visit//C Section 7/13/24/Girl, \"Harleen\"/Castro delivered//Stopped BP Meds, feels good/No complaints//Pumping/Chaperone declined).  HPI  No voiced complaints  Review of Systems  10 systems have been reviewed and are negative and noncontributory to the patient current ailments.    Objective   Physical Exam  Vital signs reviewed    CONSTITUTIONAL- well nourished, well developed, looks like stated age.  She is sitting comfortably on the exam table in no apparent distress  ABDOMEN- soft, non distended, bowel sound normal pitch and intensity,no palpable abnormal masses  GENITOURINARY- External genitalia: Normal.                                 - Uterus: AV/AF NSSC, mobile and nontender                                -The adnexal areas are free of tenderness or mass                                -There are no cervical lesions; there is no cervical motion tenderness                                -Vagina without lesions, mucosa pink and well-hydrated, discharge is physiologic.                                      Assessment/Plan   Diagnoses and all orders for this visit:  Postpartum state ([facility])  -Discussed birth control options  -Needed         Quoc Sawyer DO 08/26/24 11:38 AM   "

## 2024-08-28 ENCOUNTER — APPOINTMENT (OUTPATIENT)
Dept: OBSTETRICS AND GYNECOLOGY | Facility: CLINIC | Age: 34
End: 2024-08-28
Payer: COMMERCIAL

## 2024-09-09 ENCOUNTER — LACTATION ENCOUNTER (OUTPATIENT)
Dept: OTHER | Facility: HOSPITAL | Age: 34
End: 2024-09-09

## 2024-09-09 NOTE — LACTATION NOTE
This note was copied from a baby's chart.  Lactation Consultant Note  Lactation Consultation       Maternal Information       Maternal Assessment       Infant Assessment       Feeding Assessment       LATCH TOOL       Breast Pump       Other OB Lactation Tools       Patient Follow-up       Other OB Lactation Documentation       Recommendations/Summary  Reviewed Breastfeeding discharge information: Electric Breast Pumps & Milk Storage for Your Healthy Baby, Breast-feeding: Tips to Increase Your Milk Supply, Is My Breast-fed Baby Getting Enough to Eat?, Nursing Your Baby,  Lactation Center Information, [facility] Breastfeeding & safe sleep information, [facility] Breastfeeding Hotline.  Your baby should nurse a minimum of 8-12 times in 24 hours (every 2-3 hours). Wake a sleepy baby every 3 hours to feed, even during the night. The more often you nurse, the more milk your body will produce. Burp your baby when switching sides and at the end of a feeding. Expect at least 1 wet diaper per day for the first 2 days, increasing to 6-8 diapers per day by day 7 of age.  Mom has been mainly bottle feeding EBM fortifed = 24cal. Mom would like to offer the breast at home. Encouraged mom to contact outpatient lactation to assist with hands on help as needed.

## 2025-03-20 ENCOUNTER — APPOINTMENT (OUTPATIENT)
Dept: CARDIOLOGY | Facility: HOSPITAL | Age: 35
End: 2025-03-20
Payer: COMMERCIAL

## 2025-03-20 ENCOUNTER — HOSPITAL ENCOUNTER (EMERGENCY)
Facility: HOSPITAL | Age: 35
Discharge: HOME | End: 2025-03-20
Attending: STUDENT IN AN ORGANIZED HEALTH CARE EDUCATION/TRAINING PROGRAM
Payer: COMMERCIAL

## 2025-03-20 ENCOUNTER — APPOINTMENT (OUTPATIENT)
Dept: RADIOLOGY | Facility: HOSPITAL | Age: 35
End: 2025-03-20
Payer: COMMERCIAL

## 2025-03-20 VITALS
TEMPERATURE: 97.9 F | WEIGHT: 170 LBS | RESPIRATION RATE: 18 BRPM | DIASTOLIC BLOOD PRESSURE: 87 MMHG | BODY MASS INDEX: 30.12 KG/M2 | OXYGEN SATURATION: 99 % | HEART RATE: 82 BPM | HEIGHT: 63 IN | SYSTOLIC BLOOD PRESSURE: 135 MMHG

## 2025-03-20 DIAGNOSIS — R07.9 CHEST PAIN, UNSPECIFIED TYPE: Primary | ICD-10-CM

## 2025-03-20 LAB
ALBUMIN SERPL BCP-MCNC: 4.7 G/DL (ref 3.4–5)
ALP SERPL-CCNC: 60 U/L (ref 33–110)
ALT SERPL W P-5'-P-CCNC: 11 U/L (ref 7–45)
ANION GAP SERPL CALC-SCNC: 13 MMOL/L (ref 10–20)
AST SERPL W P-5'-P-CCNC: 15 U/L (ref 9–39)
ATRIAL RATE: 83 BPM
BASOPHILS # BLD AUTO: 0.04 X10*3/UL (ref 0–0.1)
BASOPHILS NFR BLD AUTO: 0.5 %
BILIRUB SERPL-MCNC: 0.7 MG/DL (ref 0–1.2)
BNP SERPL-MCNC: 9 PG/ML (ref 0–99)
BUN SERPL-MCNC: 18 MG/DL (ref 6–23)
CALCIUM SERPL-MCNC: 9.8 MG/DL (ref 8.6–10.3)
CARDIAC TROPONIN I PNL SERPL HS: <3 NG/L (ref 0–13)
CARDIAC TROPONIN I PNL SERPL HS: <3 NG/L (ref 0–13)
CHLORIDE SERPL-SCNC: 103 MMOL/L (ref 98–107)
CO2 SERPL-SCNC: 23 MMOL/L (ref 21–32)
CREAT SERPL-MCNC: 0.7 MG/DL (ref 0.5–1.05)
EGFRCR SERPLBLD CKD-EPI 2021: >90 ML/MIN/1.73M*2
EOSINOPHIL # BLD AUTO: 0.08 X10*3/UL (ref 0–0.7)
EOSINOPHIL NFR BLD AUTO: 1.1 %
ERYTHROCYTE [DISTWIDTH] IN BLOOD BY AUTOMATED COUNT: 12.6 % (ref 11.5–14.5)
FLUAV RNA RESP QL NAA+PROBE: NOT DETECTED
FLUBV RNA RESP QL NAA+PROBE: NOT DETECTED
GLUCOSE SERPL-MCNC: 83 MG/DL (ref 74–99)
HCT VFR BLD AUTO: 42.9 % (ref 36–46)
HGB BLD-MCNC: 14.3 G/DL (ref 12–16)
HOLD SPECIMEN: NORMAL
IMM GRANULOCYTES # BLD AUTO: 0.01 X10*3/UL (ref 0–0.7)
IMM GRANULOCYTES NFR BLD AUTO: 0.1 % (ref 0–0.9)
LYMPHOCYTES # BLD AUTO: 2.13 X10*3/UL (ref 1.2–4.8)
LYMPHOCYTES NFR BLD AUTO: 28.3 %
MAGNESIUM SERPL-MCNC: 2.1 MG/DL (ref 1.6–2.4)
MCH RBC QN AUTO: 28.8 PG (ref 26–34)
MCHC RBC AUTO-ENTMCNC: 33.3 G/DL (ref 32–36)
MCV RBC AUTO: 86 FL (ref 80–100)
MONOCYTES # BLD AUTO: 0.51 X10*3/UL (ref 0.1–1)
MONOCYTES NFR BLD AUTO: 6.8 %
NEUTROPHILS # BLD AUTO: 4.76 X10*3/UL (ref 1.2–7.7)
NEUTROPHILS NFR BLD AUTO: 63.2 %
NRBC BLD-RTO: 0 /100 WBCS (ref 0–0)
P AXIS: 68 DEGREES
P OFFSET: 191 MS
P ONSET: 138 MS
PLATELET # BLD AUTO: 280 X10*3/UL (ref 150–450)
POTASSIUM SERPL-SCNC: 4.1 MMOL/L (ref 3.5–5.3)
PR INTERVAL: 166 MS
PROT SERPL-MCNC: 7.8 G/DL (ref 6.4–8.2)
Q ONSET: 221 MS
QRS COUNT: 13 BEATS
QRS DURATION: 82 MS
QT INTERVAL: 366 MS
QTC CALCULATION(BAZETT): 430 MS
QTC FREDERICIA: 408 MS
R AXIS: 57 DEGREES
RBC # BLD AUTO: 4.97 X10*6/UL (ref 4–5.2)
RSV RNA RESP QL NAA+PROBE: NOT DETECTED
SARS-COV-2 RNA RESP QL NAA+PROBE: NOT DETECTED
SODIUM SERPL-SCNC: 135 MMOL/L (ref 136–145)
T AXIS: 48 DEGREES
T OFFSET: 404 MS
VENTRICULAR RATE: 83 BPM
WBC # BLD AUTO: 7.5 X10*3/UL (ref 4.4–11.3)

## 2025-03-20 PROCEDURE — 93005 ELECTROCARDIOGRAM TRACING: CPT

## 2025-03-20 PROCEDURE — 84484 ASSAY OF TROPONIN QUANT: CPT | Performed by: EMERGENCY MEDICINE

## 2025-03-20 PROCEDURE — 36415 COLL VENOUS BLD VENIPUNCTURE: CPT | Performed by: EMERGENCY MEDICINE

## 2025-03-20 PROCEDURE — 83735 ASSAY OF MAGNESIUM: CPT | Performed by: EMERGENCY MEDICINE

## 2025-03-20 PROCEDURE — 84484 ASSAY OF TROPONIN QUANT: CPT | Performed by: STUDENT IN AN ORGANIZED HEALTH CARE EDUCATION/TRAINING PROGRAM

## 2025-03-20 PROCEDURE — 83735 ASSAY OF MAGNESIUM: CPT | Performed by: STUDENT IN AN ORGANIZED HEALTH CARE EDUCATION/TRAINING PROGRAM

## 2025-03-20 PROCEDURE — 85025 COMPLETE CBC W/AUTO DIFF WBC: CPT | Performed by: STUDENT IN AN ORGANIZED HEALTH CARE EDUCATION/TRAINING PROGRAM

## 2025-03-20 PROCEDURE — 83880 ASSAY OF NATRIURETIC PEPTIDE: CPT

## 2025-03-20 PROCEDURE — 71046 X-RAY EXAM CHEST 2 VIEWS: CPT | Mod: COMPUTED RADIOGRAPHY X-RAY | Performed by: RADIOLOGY

## 2025-03-20 PROCEDURE — 99285 EMERGENCY DEPT VISIT HI MDM: CPT | Mod: 25 | Performed by: STUDENT IN AN ORGANIZED HEALTH CARE EDUCATION/TRAINING PROGRAM

## 2025-03-20 PROCEDURE — 87637 SARSCOV2&INF A&B&RSV AMP PRB: CPT

## 2025-03-20 PROCEDURE — 99285 EMERGENCY DEPT VISIT HI MDM: CPT | Performed by: STUDENT IN AN ORGANIZED HEALTH CARE EDUCATION/TRAINING PROGRAM

## 2025-03-20 PROCEDURE — 71046 X-RAY EXAM CHEST 2 VIEWS: CPT | Mod: FY

## 2025-03-20 PROCEDURE — 80053 COMPREHEN METABOLIC PANEL: CPT | Performed by: STUDENT IN AN ORGANIZED HEALTH CARE EDUCATION/TRAINING PROGRAM

## 2025-03-20 PROCEDURE — 80053 COMPREHEN METABOLIC PANEL: CPT | Performed by: EMERGENCY MEDICINE

## 2025-03-20 PROCEDURE — 85025 COMPLETE CBC W/AUTO DIFF WBC: CPT | Performed by: EMERGENCY MEDICINE

## 2025-03-20 RX ORDER — PREDNISONE 10 MG/1
10 TABLET ORAL DAILY
Qty: 51 TABLET | Refills: 0 | Status: SHIPPED | OUTPATIENT
Start: 2025-03-20 | End: 2025-03-20

## 2025-03-20 RX ORDER — VALACYCLOVIR HYDROCHLORIDE 1 G/1
1000 TABLET, FILM COATED ORAL 3 TIMES DAILY
Qty: 21 TABLET | Refills: 0 | Status: SHIPPED | OUTPATIENT
Start: 2025-03-20 | End: 2025-03-20

## 2025-03-20 ASSESSMENT — PAIN - FUNCTIONAL ASSESSMENT: PAIN_FUNCTIONAL_ASSESSMENT: 0-10

## 2025-03-20 ASSESSMENT — LIFESTYLE VARIABLES
HAVE YOU EVER FELT YOU SHOULD CUT DOWN ON YOUR DRINKING: NO
TOTAL SCORE: 0
HAVE PEOPLE ANNOYED YOU BY CRITICIZING YOUR DRINKING: NO
EVER FELT BAD OR GUILTY ABOUT YOUR DRINKING: NO
EVER HAD A DRINK FIRST THING IN THE MORNING TO STEADY YOUR NERVES TO GET RID OF A HANGOVER: NO

## 2025-03-20 ASSESSMENT — COLUMBIA-SUICIDE SEVERITY RATING SCALE - C-SSRS
1. IN THE PAST MONTH, HAVE YOU WISHED YOU WERE DEAD OR WISHED YOU COULD GO TO SLEEP AND NOT WAKE UP?: NO
2. HAVE YOU ACTUALLY HAD ANY THOUGHTS OF KILLING YOURSELF?: NO
6. HAVE YOU EVER DONE ANYTHING, STARTED TO DO ANYTHING, OR PREPARED TO DO ANYTHING TO END YOUR LIFE?: NO

## 2025-03-20 ASSESSMENT — PAIN SCALES - GENERAL: PAINLEVEL_OUTOF10: 0 - NO PAIN

## 2025-03-20 NOTE — DISCHARGE INSTRUCTIONS
"\"You were seen in the Emergency Department for chest pain. There was no critical cause of your chest pain found on your work-up today. Your risk for damage to your heart, aorta (main vessel exiting the heart), or clot in the heart or lungs was low or tested negative for.    Please rest and utilize tylenol as needed for your pain.  Follow-up with the primary care physician you have been referred to within 48 hours to discuss whether or not she will need blood pressure medication.    Seek immediate medical attention if you develop: new or worsening chest pain, nausea, vomiting, weakness, numbness, tingling, excessive sweating, shortness of breath, difficulty breathing, fainting, fevers 38 C (100.4 F) or higher, loss of motion in your arms or legs, or any new or worsening symptoms.\"  "

## 2025-03-20 NOTE — ED PROVIDER NOTES
Emergency Department Provider Note        History of Present Illness     History provided by: Patient  Limitations to History: None  External Records Reviewed with Brief Summary: None    HPI:  Chapo Martinez is a 34 y.o. female ***    Physical Exam   Triage vitals:  T 36.6 °C (97.9 °F)  HR 87  /87  RR 16  O2 98 % None (Room air)    General: Awake, alert, in no acute distress  Eyes: Gaze conjugate.  No scleral icterus or injection  HENT: Normo-cephalic, atraumatic. No stridor  CV: Regular rate, regular rhythm. Radial pulses 2+ bilaterally  Resp: Breathing non-labored, speaking in full sentences.  Clear to auscultation bilaterally  GI: Soft, non-distended, non-tender. No rebound or guarding.  : Not examined.  MSK/Extremities: No gross bony deformities. Moving all extremities  Skin: Warm. Appropriate color  Neuro: Alert. Oriented. Face symmetric. Speech is fluent.  Gross strength and sensation intact in b/l UE and LEs  Psych: Appropriate mood and affect    Medical Decision Making & ED Course   Medical Decision Makin y.o. female ***  ----  Scoring Tools Utilized:       Differential diagnoses considered include but are not limited to: ***     Social Determinants of Health which Significantly Impact Care: None identified {The following actions were taken to address these social determinants:[id number]}    EKG Independent Interpretation: EKG interpreted by myself. Please see ED Course for full interpretation.    Independent Result Review and Interpretation: Relevant laboratory and radiographic results were reviewed and independently interpreted by myself.  As necessary, they are commented on in the ED Course.    Chronic conditions affecting the patient's care: As documented above in [facility]    The patient was discussed with the following consultants/services: None    Care Considerations: As documented above in [facility]    ED Course:  ED Course as of 25 1834   Thu Mar 20, 2025   173 EKG today shows NSR with  normal rate 83, no changes from prior, no ischemic changes.  Patient well score low, does PERC out, will not do a D-dimer [JH]      ED Course User Index  [] Yonis Reed MD         Diagnoses as of 03/20/25 1834   Chest pain, unspecified type     Disposition   As a result of the work-up, the patient was discharged home.  she was informed of her diagnosis and instructed to come back with any concerns or worsening of condition.  she and was agreeable to the plan as discussed above.  she was given the opportunity to ask questions.  All of the patient's questions were answered.    Procedures   Procedures    This was a shared visit with an ED attending.  The patient was seen and discussed with the ED attending Dr. Reed.    Nneka Saldana MD  Emergency Medicine       Differential diagnoses considered include but are not limited to: ACS, PE, or URI     Social Determinants of Health which Significantly Impact Care: None identified     EKG Independent Interpretation: EKG interpreted by myself. Please see ED Course for full interpretation.    Independent Result Review and Interpretation: Relevant laboratory and radiographic results were reviewed and independently interpreted by myself.  As necessary, they are commented on in the ED Course.    Chronic conditions affecting the patient's care: As documented above in [facility]    The patient was discussed with the following consultants/services: None    Care Considerations: As documented above in [facility]    ED Course:  ED Course as of 03/20/25 1834   Thu Mar 20, 2025   1736 EKG today shows NSR with normal rate 83, no changes from prior, no ischemic changes.  Patient well score low, does PERC out, will not do a D-dimer [JH]      ED Course User Index  [JH] Yonis Reed MD         Diagnoses as of 03/20/25 1834   Chest pain, unspecified type     Disposition   As a result of the work-up, the patient was discharged home.  she was informed of her diagnosis and instructed to come back with any concerns or worsening of condition.  she and was agreeable to the plan as discussed above.  she was given the opportunity to ask questions.  All of the patient's questions were answered.    Procedures   Procedures    This was a shared visit with an ED attending.  The patient was seen and discussed with the ED attending Dr. Reed.    Nneka Saldana MD  Emergency Medicine     Nneka Saldana MD  Resident  03/24/25 1977       Yonis Reed MD  03/24/25 4601

## 2025-03-31 LAB
ATRIAL RATE: 83 BPM
P AXIS: 68 DEGREES
P OFFSET: 191 MS
P ONSET: 138 MS
PR INTERVAL: 166 MS
Q ONSET: 221 MS
QRS COUNT: 13 BEATS
QRS DURATION: 82 MS
QT INTERVAL: 366 MS
QTC CALCULATION(BAZETT): 430 MS
QTC FREDERICIA: 408 MS
R AXIS: 57 DEGREES
T AXIS: 48 DEGREES
T OFFSET: 404 MS
VENTRICULAR RATE: 83 BPM

## (undated) DEVICE — SUTURE, VICRYL 0, 36 IN, CT-1, VIOLET

## (undated) DEVICE — SUTURE, MONOCRYL PLUS, 4-0, PS-2 UD 27IN

## (undated) DEVICE — GLOVE, SURGICAL, PROTEXIS PI BLUE W/NEUTHERA, 6.0, PF, LF

## (undated) DEVICE — SUTURE, VICRYL, 0, 36 IN, CT, UNDYED

## (undated) DEVICE — SUTURE, PDS II, 0 36 IN, CT-1, VIOLET

## (undated) DEVICE — DRAPE PACK, CESAREAN SECTION, CUSTOM, UHC

## (undated) DEVICE — SUTURE, MONOCRYL, 2-0, 36 IN, CTX, VIOLET

## (undated) DEVICE — GLOVE, SURGICAL, PROTEXIS PI MICRO, 6.0, PF, LF

## (undated) DEVICE — TOWEL PACK, STERILE, 4/PACK, BLUE